# Patient Record
Sex: FEMALE | Race: WHITE | NOT HISPANIC OR LATINO | Employment: OTHER | URBAN - METROPOLITAN AREA
[De-identification: names, ages, dates, MRNs, and addresses within clinical notes are randomized per-mention and may not be internally consistent; named-entity substitution may affect disease eponyms.]

---

## 2017-04-13 ENCOUNTER — HOSPITAL ENCOUNTER (OUTPATIENT)
Dept: MAMMOGRAPHY | Facility: HOSPITAL | Age: 72
Discharge: HOME | End: 2017-04-13
Attending: INTERNAL MEDICINE

## 2017-04-13 DIAGNOSIS — Z12.31 VISIT FOR SCREENING MAMMOGRAM: ICD-10-CM

## 2017-04-17 ENCOUNTER — OFFICE VISIT - HEALTHEAST (OUTPATIENT)
Dept: INTERNAL MEDICINE | Facility: CLINIC | Age: 72
End: 2017-04-17

## 2017-04-17 DIAGNOSIS — M16.11 PRIMARY OSTEOARTHRITIS OF RIGHT HIP: ICD-10-CM

## 2017-04-17 DIAGNOSIS — R06.09 DYSPNEA ON EXERTION: ICD-10-CM

## 2017-04-17 DIAGNOSIS — R91.1 PULMONARY NODULE: ICD-10-CM

## 2017-04-17 DIAGNOSIS — G89.29 CHRONIC LOW BACK PAIN WITHOUT SCIATICA: ICD-10-CM

## 2017-04-17 DIAGNOSIS — I47.10 SVT (SUPRAVENTRICULAR TACHYCARDIA) (H): ICD-10-CM

## 2017-04-17 DIAGNOSIS — E78.00 HYPERCHOLESTEROLEMIA: ICD-10-CM

## 2017-04-17 DIAGNOSIS — M81.0 OSTEOPOROSIS: ICD-10-CM

## 2017-04-17 DIAGNOSIS — Z00.00 MEDICARE ANNUAL WELLNESS VISIT, SUBSEQUENT: ICD-10-CM

## 2017-04-17 DIAGNOSIS — I25.10 CORONARY ATHEROSCLEROSIS DUE TO CALCIFIED CORONARY LESION: ICD-10-CM

## 2017-04-17 DIAGNOSIS — I25.84 CORONARY ATHEROSCLEROSIS DUE TO CALCIFIED CORONARY LESION: ICD-10-CM

## 2017-04-17 DIAGNOSIS — M54.50 CHRONIC LOW BACK PAIN WITHOUT SCIATICA: ICD-10-CM

## 2017-04-17 DIAGNOSIS — R10.13 EPIGASTRIC ABDOMINAL PAIN: ICD-10-CM

## 2017-04-17 DIAGNOSIS — I35.0 MILD AORTIC STENOSIS: ICD-10-CM

## 2017-04-17 DIAGNOSIS — D47.2 MONOCLONAL GAMMOPATHY OF UNDETERMINED SIGNIFICANCE: ICD-10-CM

## 2017-04-17 LAB
CHOLEST SERPL-MCNC: 234 MG/DL
FASTING STATUS PATIENT QL REPORTED: YES
HDLC SERPL-MCNC: 69 MG/DL
LDLC SERPL CALC-MCNC: 145 MG/DL
TRIGL SERPL-MCNC: 100 MG/DL

## 2017-04-17 ASSESSMENT — MIFFLIN-ST. JEOR: SCORE: 1276.94

## 2017-04-18 ENCOUNTER — COMMUNICATION - HEALTHEAST (OUTPATIENT)
Dept: INTERNAL MEDICINE | Facility: CLINIC | Age: 72
End: 2017-04-18

## 2017-04-19 ENCOUNTER — HOSPITAL ENCOUNTER (OUTPATIENT)
Dept: ULTRASOUND IMAGING | Facility: HOSPITAL | Age: 72
Discharge: HOME | End: 2017-04-19
Attending: INTERNAL MEDICINE

## 2017-04-19 DIAGNOSIS — R10.13 EPIGASTRIC ABDOMINAL PAIN: ICD-10-CM

## 2017-04-21 ENCOUNTER — COMMUNICATION - HEALTHEAST (OUTPATIENT)
Dept: INTERNAL MEDICINE | Facility: CLINIC | Age: 72
End: 2017-04-21

## 2017-04-24 ENCOUNTER — RECORDS - HEALTHEAST (OUTPATIENT)
Dept: BONE DENSITY | Facility: CLINIC | Age: 72
End: 2017-04-24

## 2017-04-24 ENCOUNTER — RECORDS - HEALTHEAST (OUTPATIENT)
Dept: ADMINISTRATIVE | Facility: OTHER | Age: 72
End: 2017-04-24

## 2017-04-24 DIAGNOSIS — M81.0 AGE-RELATED OSTEOPOROSIS WITHOUT CURRENT PATHOLOGICAL FRACTURE: ICD-10-CM

## 2017-04-28 ENCOUNTER — HOSPITAL ENCOUNTER (OUTPATIENT)
Dept: CARDIOLOGY | Facility: HOSPITAL | Age: 72
Discharge: HOME | End: 2017-04-28
Attending: INTERNAL MEDICINE

## 2017-04-28 DIAGNOSIS — I35.0 MILD AORTIC STENOSIS: ICD-10-CM

## 2017-04-28 LAB
AORTIC ROOT: 4.3 CM
AORTIC VALVE MEAN VELOCITY: 179 CM/S
AV DIMENSIONLESS INDEX VTI: 0.5
AV MEAN GRADIENT: 14 MMHG
AV PEAK GRADIENT: 23 MMHG
AV VALVE AREA: 1.9 CM2
AV VELOCITY RATIO: 0.6
BSA FOR ECHO PROCEDURE: 1.92 M2
CV BLOOD PRESSURE: NORMAL MMHG
CV ECHO HEIGHT: 60 IN
CV ECHO WEIGHT: 193 LBS
DOP CALC AO PEAK VEL: 240 CM/S
DOP CALC AO VTI: 55.4 CM
DOP CALC LVOT AREA: 3.8 CM2
DOP CALC LVOT DIAMETER: 2.2 CM
DOP CALC LVOT PEAK VEL: 133 CM/S
DOP CALC LVOT STROKE VOLUME: 104.1 CM3
DOP CALCLVOT PEAK VEL VTI: 27.4 CM
EJECTION FRACTION: 61 % (ref 55–75)
FRACTIONAL SHORTENING: 28.1 % (ref 28–44)
INTERVENTRICULAR SEPTUM IN END DIASTOLE: 1.12 CM (ref 0.6–0.9)
IVS/PW RATIO: 1
LA AREA 1: 13 CM2
LA AREA 2: 17.7 CM2
LEFT ATRIUM LENGTH: 4.2 CM
LEFT ATRIUM SIZE: 3.7 CM
LEFT ATRIUM VOLUME INDEX: 24.3 ML/M2
LEFT ATRIUM VOLUME: 46.6 CM3
LEFT VENTRICLE CARDIAC INDEX: 3.8 L/MIN/M2
LEFT VENTRICLE CARDIAC OUTPUT: 7.3 L/MIN
LEFT VENTRICLE DIASTOLIC VOLUME INDEX: 60.4 CM3/M2 (ref 34–74)
LEFT VENTRICLE DIASTOLIC VOLUME: 116 CM3 (ref 46–106)
LEFT VENTRICLE HEART RATE: 70 BPM
LEFT VENTRICLE MASS INDEX: 91.4 G/M2
LEFT VENTRICLE SYSTOLIC VOLUME INDEX: 23.8 CM3/M2 (ref 11–31)
LEFT VENTRICLE SYSTOLIC VOLUME: 45.6 CM3 (ref 14–42)
LEFT VENTRICULAR INTERNAL DIMENSION IN DIASTOLE: 4.49 CM (ref 3.8–5.2)
LEFT VENTRICULAR INTERNAL DIMENSION IN SYSTOLE: 3.23 CM (ref 2.2–3.5)
LEFT VENTRICULAR MASS: 175.5 G
LEFT VENTRICULAR OUTFLOW TRACT MEAN GRADIENT: 4 MMHG
LEFT VENTRICULAR OUTFLOW TRACT MEAN VELOCITY: 99.3 CM/S
LEFT VENTRICULAR OUTFLOW TRACT PEAK GRADIENT: 7 MMHG
LEFT VENTRICULAR POSTERIOR WALL IN END DIASTOLE: 1.09 CM (ref 0.6–0.9)
LV STROKE VOLUME INDEX: 54.2 ML/M2
MITRAL VALVE E/A RATIO: 0.5
MV AVERAGE E/E' RATIO: 7.4 CM/S
MV DECELERATION TIME: 370 MS
MV E'TISSUE VEL-LAT: 7.83 CM/S
MV E'TISSUE VEL-MED: 7.64 CM/S
MV LATERAL E/E' RATIO: 7.3
MV MEDIAL E/E' RATIO: 7.5
MV PEAK A VELOCITY: 106 CM/S
MV PEAK E VELOCITY: 57.2 CM/S
NUC REST DIASTOLIC VOLUME INDEX: 3088 LBS
NUC REST SYSTOLIC VOLUME INDEX: 60 IN

## 2017-04-28 ASSESSMENT — MIFFLIN-ST. JEOR: SCORE: 1276.94

## 2017-05-24 ENCOUNTER — RECORDS - HEALTHEAST (OUTPATIENT)
Dept: ADMINISTRATIVE | Facility: OTHER | Age: 72
End: 2017-05-24

## 2017-06-27 ENCOUNTER — RECORDS - HEALTHEAST (OUTPATIENT)
Dept: ADMINISTRATIVE | Facility: OTHER | Age: 72
End: 2017-06-27

## 2017-10-19 ENCOUNTER — AMBULATORY - HEALTHEAST (OUTPATIENT)
Dept: NURSING | Facility: CLINIC | Age: 72
End: 2017-10-19

## 2018-04-20 ENCOUNTER — AMBULATORY - HEALTHEAST (OUTPATIENT)
Dept: NURSING | Facility: CLINIC | Age: 73
End: 2018-04-20

## 2018-10-05 ENCOUNTER — HOSPITAL ENCOUNTER (OUTPATIENT)
Dept: MAMMOGRAPHY | Facility: CLINIC | Age: 73
Discharge: HOME | End: 2018-10-05
Attending: INTERNAL MEDICINE

## 2018-10-05 DIAGNOSIS — Z12.31 VISIT FOR SCREENING MAMMOGRAM: ICD-10-CM

## 2018-10-19 ENCOUNTER — AMBULATORY - HEALTHEAST (OUTPATIENT)
Dept: INTERNAL MEDICINE | Facility: CLINIC | Age: 73
End: 2018-10-19

## 2018-10-22 ENCOUNTER — OFFICE VISIT - HEALTHEAST (OUTPATIENT)
Dept: INTERNAL MEDICINE | Facility: CLINIC | Age: 73
End: 2018-10-22

## 2018-10-22 DIAGNOSIS — H57.11 PAIN IN AND AROUND EYE, RIGHT: ICD-10-CM

## 2018-10-22 DIAGNOSIS — E78.00 HYPERCHOLESTEROLEMIA: ICD-10-CM

## 2018-10-22 DIAGNOSIS — G89.29 CHRONIC LOW BACK PAIN WITHOUT SCIATICA: ICD-10-CM

## 2018-10-22 DIAGNOSIS — I35.0 MILD AORTIC STENOSIS: ICD-10-CM

## 2018-10-22 DIAGNOSIS — D47.2 MONOCLONAL GAMMOPATHY OF UNDETERMINED SIGNIFICANCE: ICD-10-CM

## 2018-10-22 DIAGNOSIS — I77.810 DILATATION OF THORACIC AORTA (H): ICD-10-CM

## 2018-10-22 DIAGNOSIS — I25.10 CORONARY ATHEROSCLEROSIS DUE TO CALCIFIED CORONARY LESION: ICD-10-CM

## 2018-10-22 DIAGNOSIS — Z00.00 MEDICARE ANNUAL WELLNESS VISIT, SUBSEQUENT: ICD-10-CM

## 2018-10-22 DIAGNOSIS — R91.1 PULMONARY NODULE: ICD-10-CM

## 2018-10-22 DIAGNOSIS — M54.50 CHRONIC LOW BACK PAIN WITHOUT SCIATICA: ICD-10-CM

## 2018-10-22 DIAGNOSIS — Z00.00 HEALTHCARE MAINTENANCE: ICD-10-CM

## 2018-10-22 DIAGNOSIS — M16.11 PRIMARY OSTEOARTHRITIS OF RIGHT HIP: ICD-10-CM

## 2018-10-22 DIAGNOSIS — I25.84 CORONARY ATHEROSCLEROSIS DUE TO CALCIFIED CORONARY LESION: ICD-10-CM

## 2018-10-22 DIAGNOSIS — M81.0 OSTEOPOROSIS: ICD-10-CM

## 2018-10-22 DIAGNOSIS — K44.9 DIAPHRAGMATIC HERNIA: ICD-10-CM

## 2018-10-22 DIAGNOSIS — I47.10 SVT (SUPRAVENTRICULAR TACHYCARDIA) (H): ICD-10-CM

## 2018-10-22 LAB
ALBUMIN SERPL-MCNC: 4 G/DL (ref 3.5–5)
ALBUMIN UR-MCNC: NEGATIVE MG/DL
ALP SERPL-CCNC: 56 U/L (ref 45–120)
ALT SERPL W P-5'-P-CCNC: 14 U/L (ref 0–45)
ANION GAP SERPL CALCULATED.3IONS-SCNC: 12 MMOL/L (ref 5–18)
APPEARANCE UR: CLEAR
AST SERPL W P-5'-P-CCNC: 18 U/L (ref 0–40)
BACTERIA #/AREA URNS HPF: ABNORMAL HPF
BILIRUB SERPL-MCNC: 1.1 MG/DL (ref 0–1)
BILIRUB UR QL STRIP: NEGATIVE
BUN SERPL-MCNC: 15 MG/DL (ref 8–28)
CALCIUM SERPL-MCNC: 9.7 MG/DL (ref 8.5–10.5)
CHLORIDE BLD-SCNC: 105 MMOL/L (ref 98–107)
CHOLEST SERPL-MCNC: 219 MG/DL
CO2 SERPL-SCNC: 27 MMOL/L (ref 22–31)
COLOR UR AUTO: YELLOW
CREAT SERPL-MCNC: 0.7 MG/DL (ref 0.6–1.1)
ERYTHROCYTE [DISTWIDTH] IN BLOOD BY AUTOMATED COUNT: 11.9 % (ref 11–14.5)
ERYTHROCYTE [SEDIMENTATION RATE] IN BLOOD BY WESTERGREN METHOD: 11 MM/HR (ref 0–20)
FASTING STATUS PATIENT QL REPORTED: YES
GFR SERPL CREATININE-BSD FRML MDRD: >60 ML/MIN/1.73M2
GLUCOSE BLD-MCNC: 87 MG/DL (ref 70–125)
GLUCOSE UR STRIP-MCNC: NEGATIVE MG/DL
HCT VFR BLD AUTO: 39.1 % (ref 35–47)
HDLC SERPL-MCNC: 71 MG/DL
HGB BLD-MCNC: 12.9 G/DL (ref 12–16)
HGB UR QL STRIP: ABNORMAL
KETONES UR STRIP-MCNC: NEGATIVE MG/DL
LDLC SERPL CALC-MCNC: 129 MG/DL
LEUKOCYTE ESTERASE UR QL STRIP: ABNORMAL
MCH RBC QN AUTO: 32.5 PG (ref 27–34)
MCHC RBC AUTO-ENTMCNC: 32.9 G/DL (ref 32–36)
MCV RBC AUTO: 99 FL (ref 80–100)
NITRATE UR QL: NEGATIVE
PH UR STRIP: 7.5 [PH] (ref 5–8)
PLATELET # BLD AUTO: 301 THOU/UL (ref 140–440)
PMV BLD AUTO: 7.4 FL (ref 7–10)
POTASSIUM BLD-SCNC: 3.8 MMOL/L (ref 3.5–5)
PROT SERPL-MCNC: 6.7 G/DL (ref 6–8)
RBC # BLD AUTO: 3.95 MILL/UL (ref 3.8–5.4)
RBC #/AREA URNS AUTO: ABNORMAL HPF
SODIUM SERPL-SCNC: 144 MMOL/L (ref 136–145)
SP GR UR STRIP: 1.01 (ref 1–1.03)
SQUAMOUS #/AREA URNS AUTO: ABNORMAL LPF
TRIGL SERPL-MCNC: 95 MG/DL
UROBILINOGEN UR STRIP-ACNC: ABNORMAL
WBC #/AREA URNS AUTO: ABNORMAL HPF
WBC: 8.2 THOU/UL (ref 4–11)

## 2018-10-22 ASSESSMENT — MIFFLIN-ST. JEOR: SCORE: 1149.94

## 2018-10-23 LAB
25(OH)D3 SERPL-MCNC: 66.3 NG/ML (ref 30–80)
25(OH)D3 SERPL-MCNC: 66.3 NG/ML (ref 30–80)

## 2018-10-24 LAB
ALBUMIN PERCENT: 65.6 % (ref 51–67)
ALBUMIN SERPL ELPH-MCNC: 4.1 G/DL (ref 3.2–4.7)
ALPHA 1 PERCENT: 2.8 % (ref 2–4)
ALPHA 2 PERCENT: 11.2 % (ref 5–13)
ALPHA1 GLOB SERPL ELPH-MCNC: 0.2 G/DL (ref 0.1–0.3)
ALPHA2 GLOB SERPL ELPH-MCNC: 0.7 G/DL (ref 0.4–0.9)
B-GLOBULIN SERPL ELPH-MCNC: 0.7 G/DL (ref 0.7–1.2)
BETA PERCENT: 11.1 % (ref 10–17)
GAMMA GLOB SERPL ELPH-MCNC: 0.6 G/DL (ref 0.6–1.4)
GAMMA GLOBULIN PERCENT: 9.3 % (ref 9–20)
M PROTEIN SERPL ELPH-MCNC: 0.1 G/DL
PATH ICD:: NORMAL
PROT PATTERN SERPL ELPH-IMP: NORMAL
PROT SERPL-MCNC: 6.3 G/DL (ref 6–8)
REVIEWING PATHOLOGIST: NORMAL

## 2018-10-26 ENCOUNTER — AMBULATORY - HEALTHEAST (OUTPATIENT)
Dept: INTERNAL MEDICINE | Facility: CLINIC | Age: 73
End: 2018-10-26

## 2018-10-26 DIAGNOSIS — R77.8 ABNORMAL SPEP: ICD-10-CM

## 2018-10-31 ENCOUNTER — AMBULATORY - HEALTHEAST (OUTPATIENT)
Dept: LAB | Facility: CLINIC | Age: 73
End: 2018-10-31

## 2018-10-31 DIAGNOSIS — R77.8 ABNORMAL SPEP: ICD-10-CM

## 2018-11-02 LAB
PATH ICD:: NORMAL
PROT PATTERN SERPL IFE-IMP: NORMAL
REVIEWING PATHOLOGIST: NORMAL

## 2019-03-27 ENCOUNTER — COMMUNICATION - HEALTHEAST (OUTPATIENT)
Dept: GENERAL RADIOLOGY | Facility: CLINIC | Age: 74
End: 2019-03-27

## 2019-04-23 ENCOUNTER — COMMUNICATION - HEALTHEAST (OUTPATIENT)
Dept: GENERAL RADIOLOGY | Facility: CLINIC | Age: 74
End: 2019-04-23

## 2019-04-24 ENCOUNTER — AMBULATORY - HEALTHEAST (OUTPATIENT)
Dept: NURSING | Facility: CLINIC | Age: 74
End: 2019-04-24

## 2019-08-08 ENCOUNTER — APPOINTMENT (OUTPATIENT)
Dept: GENERAL RADIOLOGY | Facility: CLINIC | Age: 74
End: 2019-08-08
Attending: EMERGENCY MEDICINE
Payer: COMMERCIAL

## 2019-08-08 ENCOUNTER — HOSPITAL ENCOUNTER (EMERGENCY)
Facility: CLINIC | Age: 74
Discharge: HOME | End: 2019-08-08
Attending: EMERGENCY MEDICINE | Admitting: EMERGENCY MEDICINE
Payer: COMMERCIAL

## 2019-08-08 VITALS
DIASTOLIC BLOOD PRESSURE: 97 MMHG | OXYGEN SATURATION: 97 % | HEART RATE: 56 BPM | RESPIRATION RATE: 18 BRPM | SYSTOLIC BLOOD PRESSURE: 151 MMHG | TEMPERATURE: 97.6 F

## 2019-08-08 DIAGNOSIS — S52.615A CLOSED NONDISPLACED FRACTURE OF STYLOID PROCESS OF LEFT ULNA, INITIAL ENCOUNTER: ICD-10-CM

## 2019-08-08 DIAGNOSIS — S06.0X0A CONCUSSION WITHOUT LOSS OF CONSCIOUSNESS, INITIAL ENCOUNTER: ICD-10-CM

## 2019-08-08 DIAGNOSIS — S52.572A OTHER CLOSED INTRA-ARTICULAR FRACTURE OF DISTAL END OF LEFT RADIUS, INITIAL ENCOUNTER: ICD-10-CM

## 2019-08-08 PROCEDURE — 29125 APPL SHORT ARM SPLINT STATIC: CPT | Mod: LT

## 2019-08-08 PROCEDURE — 73110 X-RAY EXAM OF WRIST: CPT | Mod: LT

## 2019-08-08 PROCEDURE — 99284 EMERGENCY DEPT VISIT MOD MDM: CPT

## 2019-08-08 RX ORDER — IBUPROFEN 600 MG/1
600 TABLET, FILM COATED ORAL EVERY 6 HOURS PRN
Qty: 20 TABLET | Refills: 0 | Status: SHIPPED | OUTPATIENT
Start: 2019-08-08 | End: 2023-07-10

## 2019-08-08 RX ORDER — HYDROCODONE BITARTRATE AND ACETAMINOPHEN 5; 325 MG/1; MG/1
1 TABLET ORAL EVERY 6 HOURS PRN
Qty: 8 TABLET | Refills: 0 | Status: SHIPPED | OUTPATIENT
Start: 2019-08-08 | End: 2023-07-10

## 2019-08-08 ASSESSMENT — ENCOUNTER SYMPTOMS
ARTHRALGIAS: 1
JOINT SWELLING: 1

## 2019-08-08 NOTE — ED AVS SNAPSHOT
[facility] Emergency Department  [address]  [facility] [address]  Phone:  [phone]  Fax:  [phone]                                    Francoise Sutherland   MRN: [id number]    Department:  [facility] Emergency Department   Date of Visit:  8/8/2019           After Visit Summary Signature Page    I have received my discharge instructions, and my questions have been answered. I have discussed any challenges I see with this plan with the nurse or doctor.    ..........................................................................................................................................  Patient/Patient Representative Signature      ..........................................................................................................................................  Patient Representative Print Name and Relationship to Patient    ..................................................               ................................................  Date                                   Time    ..........................................................................................................................................  Reviewed by Signature/Title    ...................................................              ..............................................  Date                                               Time          22EPIC Rev 08/18

## 2019-08-09 NOTE — ED PROVIDER NOTES
History     Chief Complaint:  Fall and Wrist Pain    HPI  Francoise Sutherland is a 74 year old female, R. Hand dominant, who presents to the emergency department today for evaluation left wrist pain after a fall. The patient reports she tripped on a declined sidewalk, fell backwards, landed on her outstretched left wrist, and struck her head. She endorses left wrist pain and swelling but denies LOC, paresthesias, headache or other symptoms. No anticoagulation.     Allergies:  Atorvastatin  Hydrocodone-Acetaminophen        Medications:    Medications reviewed. No pertinent medications.    Past Medical History:    Pulmonary nodule   Diaphragmatic hernia   Restrictive lung disease   Shortness of breath  Hypercholesteremia     Past Surgical History:    Hernia repair    Family History:    Family history reviewed. No pertinent family history.    Social History:  The patient is a former smoker. He has never used smokeless tobacco.    Marital Status:      Review of Systems   Musculoskeletal: Positive for arthralgias (left wrist) and joint swelling (left wrist).   All other systems reviewed and are negative.      Physical Exam     Patient Vitals for the past 24 hrs:   BP Temp Temp src Pulse Heart Rate Resp SpO2   08/08/19 2028 (!) 151/97 97.6  F (36.4  C) Oral 56 56 18 97 %     Physical Exam  General: Alert. Appears comfortable  Head:  The scalp, face, and head appear normal without trauma  Eyes:  Sclera white; Pupils are equal and round  ENT:    External ears normal.  No hemotympanum.      External nares normal.  No septal hematoma.    Neck:  No midline tenderness or pain with full ROM.  CV:  Rate as above with regular rhythm   No murmur   2/2 radial and dorsal pedal pulses  Resp:  Breath sounds clear and equal bilaterally    Non-labored, no retractions or accessory muscle use  GI:  Abdomen soft, non-tender, non-distended    No rebound tenderness or guarding  MSK:  No midline tenderness or bony step-off    No  "deformity    Moves all extremities equally and symmetrically    Mild L. Wrist tenderness, no obvious deformity, no overlying crepitance, warmth or ecchymosis; no L. Elbow tenderness  Skin:  No rash or lesions noted.  Neuro:   No apparent deficit.    Strength 5/5 x4.  Sensation intact x4.     GCS: 15  Psych:  Normal affect.        Emergency Department Course     Imaging:  Radiology findings were communicated with the patient who voiced understanding of the findings.    XR Wrist Left G/E 3 Views  Nondisplaced ulnar styloid fracture. Mildly comminuted and displaced intra-articular distal radial fracture. Diffuse osteopenia. Degenerative disease, severe at the first carpometacarpal joint and adjacent intercarpal joints.    Procedures   Splint Placement      PLACEMENT: Custom Orthoglass splint 3\" was applied to the left distal upper extremity and after placement I checked and adjusted the fit to ensure proper positioning. The patient was more comfortable with the splint in place. Sensation and circulation are intact after splint placement.   Emergency Department Course:  2106 Nursing notes and vitals reviewed.  2111 I performed a physical examination of the patient as documented above.  2039 The patient was sent for a XR Wrist Left while in the emergency department, results above.   2110 I performed the splint placement procedure as documented above.  2203 I spoke with Dr. Gomez of the orthopedic service regarding patient's presentation, findings, and plan of care.  2225 I personally reviewed the results with the patient and  and answered all related questions prior to discharge.    Impression & Plan      Medical Decision Making:  Francoise Sutherland is a 74 year old female presenting with left wrist pain. She is neurovascularly intact on arrival. X-ray with nondisplaced ulnar styloid fracture. Noted mildly comminuted intraarticular distal radius fracture. No indication for emergent reduction.  She was placed " in an Orthoglass splint and provided a sling.  She remained neurovascularly intact post splint.. Plans for close outpatient ortho follow-up. She did report hitting her head, though no evidence of trauma. There was no reported loss of consciousness, emesis. I discussed no indication for emergent head CT at this point in time though did  patient on red flag symptoms to necessitate return to the ED.  She will be managed as sustaining a concussion. Secondary impact syndrome discussed. Plans for close outpatient ortho follow up. Ortho stated they would clal patient tmrw to arrange f/u.  Return precautions given.     Diagnosis:    ICD-10-CM    1. Closed nondisplaced fracture of styloid process of left ulna, initial encounter S52.615A    2. Other closed intra-articular fracture of distal end of left radius, initial encounter S52.572A    3. Concussion without loss of consciousness, initial encounter S06.0X0A      Disposition:   The patient is discharged to home.    Discharge Medications:  START taking      Dose / Directions   HYDROcodone-acetaminophen 5-325 MG tablet  Commonly known as:  NORCO      Dose:  1 tablet  Take 1 tablet by mouth every 6 hours as needed for pain  Quantity:  8 tablet  Refills:  0     ibuprofen 600 MG tablet  Commonly known as:  ADVIL/MOTRIN      Dose:  600 mg  Take 1 tablet (600 mg) by mouth every 6 hours as needed  Quantity:  20 tablet  Refills:  0       Scribe Disclosure:  I, Shaun Vidal, am serving as a scribe at 8:53 PM on 8/8/2019 to document services personally performed by Luna Gallo DO based on my observations and the provider's statements to me.    [facility] EMERGENCY DEPARTMENT       Luna Gallo DO  08/09/19 0000

## 2019-08-09 NOTE — ED TRIAGE NOTES
Trip and fall onto cement eileen. Fell backward hitting head on sign post and hurting left wrist. Pain is worst to left wrist. Denies any LOC. ABCs intact.

## 2019-08-13 ENCOUNTER — RECORDS - HEALTHEAST (OUTPATIENT)
Dept: LAB | Facility: CLINIC | Age: 74
End: 2019-08-13

## 2019-08-13 LAB
ALP SERPL-CCNC: 55 U/L (ref 45–120)
CALCIUM SERPL-MCNC: 9.7 MG/DL (ref 8.5–10.5)
PTH-INTACT SERPL-MCNC: 43 PG/ML (ref 10–86)

## 2019-08-14 ENCOUNTER — RECORDS - HEALTHEAST (OUTPATIENT)
Dept: ADMINISTRATIVE | Facility: OTHER | Age: 74
End: 2019-08-14

## 2019-08-14 LAB — 25(OH)D3 SERPL-MCNC: 66.1 NG/ML (ref 30–80)

## 2019-10-29 ENCOUNTER — COMMUNICATION - HEALTHEAST (OUTPATIENT)
Dept: GENERAL RADIOLOGY | Facility: CLINIC | Age: 74
End: 2019-10-29

## 2019-10-31 ENCOUNTER — AMBULATORY - HEALTHEAST (OUTPATIENT)
Dept: NURSING | Facility: CLINIC | Age: 74
End: 2019-10-31

## 2020-04-07 ENCOUNTER — COMMUNICATION - HEALTHEAST (OUTPATIENT)
Dept: INTERNAL MEDICINE | Facility: CLINIC | Age: 75
End: 2020-04-07

## 2020-04-13 ENCOUNTER — RECORDS - HEALTHEAST (OUTPATIENT)
Dept: ADMINISTRATIVE | Facility: OTHER | Age: 75
End: 2020-04-13

## 2020-04-14 ENCOUNTER — COMMUNICATION - HEALTHEAST (OUTPATIENT)
Dept: INTERNAL MEDICINE | Facility: CLINIC | Age: 75
End: 2020-04-14

## 2020-04-15 ENCOUNTER — RECORDS - HEALTHEAST (OUTPATIENT)
Dept: ADMINISTRATIVE | Facility: OTHER | Age: 75
End: 2020-04-15

## 2020-04-15 ENCOUNTER — COMMUNICATION - HEALTHEAST (OUTPATIENT)
Dept: SCHEDULING | Facility: CLINIC | Age: 75
End: 2020-04-15

## 2020-04-17 ENCOUNTER — RECORDS - HEALTHEAST (OUTPATIENT)
Dept: ADMINISTRATIVE | Facility: OTHER | Age: 75
End: 2020-04-17

## 2020-04-20 ENCOUNTER — RECORDS - HEALTHEAST (OUTPATIENT)
Dept: ADMINISTRATIVE | Facility: OTHER | Age: 75
End: 2020-04-20

## 2020-04-21 ENCOUNTER — COMMUNICATION - HEALTHEAST (OUTPATIENT)
Dept: INTERNAL MEDICINE | Facility: CLINIC | Age: 75
End: 2020-04-21

## 2020-04-24 ENCOUNTER — OFFICE VISIT - HEALTHEAST (OUTPATIENT)
Dept: INTERNAL MEDICINE | Facility: CLINIC | Age: 75
End: 2020-04-24

## 2020-04-24 DIAGNOSIS — I33.0 ACUTE BACTERIAL ENDOCARDITIS: ICD-10-CM

## 2020-04-24 ASSESSMENT — PATIENT HEALTH QUESTIONNAIRE - PHQ9: SUM OF ALL RESPONSES TO PHQ QUESTIONS 1-9: 0

## 2020-04-27 ENCOUNTER — COMMUNICATION - HEALTHEAST (OUTPATIENT)
Dept: SCHEDULING | Facility: CLINIC | Age: 75
End: 2020-04-27

## 2020-04-27 ENCOUNTER — OFFICE VISIT - HEALTHEAST (OUTPATIENT)
Dept: INTERNAL MEDICINE | Facility: CLINIC | Age: 75
End: 2020-04-27

## 2020-04-27 DIAGNOSIS — M79.662 PAIN OF LEFT LOWER LEG: ICD-10-CM

## 2020-04-27 DIAGNOSIS — L98.9 LEG LESION: ICD-10-CM

## 2020-04-28 ENCOUNTER — RECORDS - HEALTHEAST (OUTPATIENT)
Dept: SCHEDULING | Facility: CLINIC | Age: 75
End: 2020-04-28

## 2020-04-28 ENCOUNTER — HOSPITAL ENCOUNTER (OUTPATIENT)
Dept: ULTRASOUND IMAGING | Facility: HOSPITAL | Age: 75
Discharge: HOME | End: 2020-04-28
Attending: INTERNAL MEDICINE

## 2020-04-28 ENCOUNTER — COMMUNICATION - HEALTHEAST (OUTPATIENT)
Dept: INTERNAL MEDICINE | Facility: CLINIC | Age: 75
End: 2020-04-28

## 2020-04-28 DIAGNOSIS — M79.662 PAIN OF LEFT LOWER LEG: ICD-10-CM

## 2020-04-30 ENCOUNTER — RECORDS - HEALTHEAST (OUTPATIENT)
Dept: ADMINISTRATIVE | Facility: OTHER | Age: 75
End: 2020-04-30

## 2020-05-01 ENCOUNTER — RECORDS - HEALTHEAST (OUTPATIENT)
Dept: ADMINISTRATIVE | Facility: OTHER | Age: 75
End: 2020-05-01

## 2020-05-05 ENCOUNTER — RECORDS - HEALTHEAST (OUTPATIENT)
Dept: ADMINISTRATIVE | Facility: OTHER | Age: 75
End: 2020-05-05

## 2020-05-06 ENCOUNTER — RECORDS - HEALTHEAST (OUTPATIENT)
Dept: ADMINISTRATIVE | Facility: OTHER | Age: 75
End: 2020-05-06

## 2020-05-12 ENCOUNTER — COMMUNICATION - HEALTHEAST (OUTPATIENT)
Dept: INTERNAL MEDICINE | Facility: CLINIC | Age: 75
End: 2020-05-12

## 2020-05-19 ENCOUNTER — RECORDS - HEALTHEAST (OUTPATIENT)
Dept: ADMINISTRATIVE | Facility: OTHER | Age: 75
End: 2020-05-19

## 2020-05-24 ENCOUNTER — RECORDS - HEALTHEAST (OUTPATIENT)
Dept: ADMINISTRATIVE | Facility: OTHER | Age: 75
End: 2020-05-24

## 2020-05-27 ENCOUNTER — RECORDS - HEALTHEAST (OUTPATIENT)
Dept: ADMINISTRATIVE | Facility: OTHER | Age: 75
End: 2020-05-27

## 2020-06-30 ENCOUNTER — COMMUNICATION - HEALTHEAST (OUTPATIENT)
Dept: SCHEDULING | Facility: CLINIC | Age: 75
End: 2020-06-30

## 2020-07-08 ENCOUNTER — AMBULATORY - HEALTHEAST (OUTPATIENT)
Dept: INTERNAL MEDICINE | Facility: CLINIC | Age: 75
End: 2020-07-08

## 2020-07-08 ENCOUNTER — OFFICE VISIT - HEALTHEAST (OUTPATIENT)
Dept: INTERNAL MEDICINE | Facility: CLINIC | Age: 75
End: 2020-07-08

## 2020-07-08 DIAGNOSIS — U07.1 COVID-19: ICD-10-CM

## 2020-07-08 DIAGNOSIS — E78.00 HYPERCHOLESTEROLEMIA: ICD-10-CM

## 2020-07-08 DIAGNOSIS — I25.84 CORONARY ATHEROSCLEROSIS DUE TO CALCIFIED CORONARY LESION: ICD-10-CM

## 2020-07-08 DIAGNOSIS — I47.10 SVT (SUPRAVENTRICULAR TACHYCARDIA) (H): ICD-10-CM

## 2020-07-08 DIAGNOSIS — R53.83 FATIGUE, UNSPECIFIED TYPE: ICD-10-CM

## 2020-07-08 DIAGNOSIS — I33.0 ACUTE BACTERIAL ENDOCARDITIS: ICD-10-CM

## 2020-07-08 DIAGNOSIS — E83.52 HYPERCALCEMIA: ICD-10-CM

## 2020-07-08 DIAGNOSIS — Z12.31 ENCOUNTER FOR SCREENING MAMMOGRAM FOR BREAST CANCER: ICD-10-CM

## 2020-07-08 DIAGNOSIS — D47.2 MONOCLONAL GAMMOPATHY OF UNDETERMINED SIGNIFICANCE: ICD-10-CM

## 2020-07-08 DIAGNOSIS — M81.0 AGE-RELATED OSTEOPOROSIS WITHOUT CURRENT PATHOLOGICAL FRACTURE: ICD-10-CM

## 2020-07-08 DIAGNOSIS — I25.10 CORONARY ATHEROSCLEROSIS DUE TO CALCIFIED CORONARY LESION: ICD-10-CM

## 2020-07-08 LAB
ALBUMIN SERPL-MCNC: 3.9 G/DL (ref 3.5–5)
ALP SERPL-CCNC: 55 U/L (ref 45–120)
ALT SERPL W P-5'-P-CCNC: 10 U/L (ref 0–45)
ANION GAP SERPL CALCULATED.3IONS-SCNC: 12 MMOL/L (ref 5–18)
AST SERPL W P-5'-P-CCNC: 18 U/L (ref 0–40)
BILIRUB SERPL-MCNC: 1 MG/DL (ref 0–1)
BUN SERPL-MCNC: 13 MG/DL (ref 8–28)
CALCIUM SERPL-MCNC: 11.7 MG/DL (ref 8.5–10.5)
CHLORIDE BLD-SCNC: 104 MMOL/L (ref 98–107)
CHOLEST SERPL-MCNC: 191 MG/DL
CO2 SERPL-SCNC: 28 MMOL/L (ref 22–31)
CREAT SERPL-MCNC: 1.01 MG/DL (ref 0.6–1.1)
ERYTHROCYTE [DISTWIDTH] IN BLOOD BY AUTOMATED COUNT: 14.3 % (ref 11–14.5)
FASTING STATUS PATIENT QL REPORTED: YES
GFR SERPL CREATININE-BSD FRML MDRD: 53 ML/MIN/1.73M2
GLUCOSE BLD-MCNC: 86 MG/DL (ref 70–125)
HCT VFR BLD AUTO: 33.3 % (ref 35–47)
HDLC SERPL-MCNC: 53 MG/DL
HGB BLD-MCNC: 11.1 G/DL (ref 12–16)
LDLC SERPL CALC-MCNC: 114 MG/DL
MCH RBC QN AUTO: 31.6 PG (ref 27–34)
MCHC RBC AUTO-ENTMCNC: 33.4 G/DL (ref 32–36)
MCV RBC AUTO: 95 FL (ref 80–100)
PLATELET # BLD AUTO: 327 THOU/UL (ref 140–440)
PMV BLD AUTO: 7.4 FL (ref 7–10)
POTASSIUM BLD-SCNC: 3.6 MMOL/L (ref 3.5–5)
PROT SERPL-MCNC: 6.8 G/DL (ref 6–8)
RBC # BLD AUTO: 3.52 MILL/UL (ref 3.8–5.4)
SODIUM SERPL-SCNC: 144 MMOL/L (ref 136–145)
TRIGL SERPL-MCNC: 122 MG/DL
TSH SERPL DL<=0.005 MIU/L-ACNC: 1.53 UIU/ML (ref 0.3–5)
WBC: 7 THOU/UL (ref 4–11)

## 2020-07-08 ASSESSMENT — MIFFLIN-ST. JEOR: SCORE: 1081.9

## 2020-07-09 LAB
25(OH)D3 SERPL-MCNC: 89.6 NG/ML (ref 30–80)
25(OH)D3 SERPL-MCNC: 89.6 NG/ML (ref 30–80)

## 2020-07-10 LAB
ALBUMIN PERCENT: 65.4 % (ref 51–67)
ALBUMIN SERPL ELPH-MCNC: 4.3 G/DL (ref 3.2–4.7)
ALPHA 1 PERCENT: 2.9 % (ref 2–4)
ALPHA 2 PERCENT: 10.1 % (ref 5–13)
ALPHA1 GLOB SERPL ELPH-MCNC: 0.2 G/DL (ref 0.1–0.3)
ALPHA2 GLOB SERPL ELPH-MCNC: 0.7 G/DL (ref 0.4–0.9)
B-GLOBULIN SERPL ELPH-MCNC: 0.7 G/DL (ref 0.7–1.2)
BETA PERCENT: 11.1 % (ref 10–17)
GAMMA GLOB SERPL ELPH-MCNC: 0.7 G/DL (ref 0.6–1.4)
GAMMA GLOBULIN PERCENT: 10.5 % (ref 9–20)
M PROTEIN SERPL ELPH-MCNC: 0.1 G/DL
PATH ICD:: NORMAL
PROT PATTERN SERPL ELPH-IMP: NORMAL
PROT SERPL-MCNC: 6.5 G/DL (ref 6–8)
REVIEWING PATHOLOGIST: NORMAL

## 2020-07-14 ENCOUNTER — OFFICE VISIT - HEALTHEAST (OUTPATIENT)
Dept: INTERNAL MEDICINE | Facility: CLINIC | Age: 75
End: 2020-07-14

## 2020-07-14 DIAGNOSIS — Z00.00 MEDICARE ANNUAL WELLNESS VISIT, SUBSEQUENT: ICD-10-CM

## 2020-07-14 DIAGNOSIS — M81.0 AGE-RELATED OSTEOPOROSIS WITHOUT CURRENT PATHOLOGICAL FRACTURE: ICD-10-CM

## 2020-07-14 DIAGNOSIS — E83.52 HYPERCALCEMIA: ICD-10-CM

## 2020-07-14 DIAGNOSIS — Z12.11 ENCOUNTER FOR SCREENING FOR MALIGNANT NEOPLASM OF COLON: ICD-10-CM

## 2020-07-14 DIAGNOSIS — D64.9 ANEMIA, UNSPECIFIED TYPE: ICD-10-CM

## 2020-07-14 DIAGNOSIS — D47.2 MONOCLONAL GAMMOPATHY OF UNDETERMINED SIGNIFICANCE: ICD-10-CM

## 2020-07-14 LAB
PATH ICD:: NORMAL
PROT PATTERN SERPL IFE-IMP: NORMAL
REVIEWING PATHOLOGIST: NORMAL

## 2020-07-16 ENCOUNTER — AMBULATORY - HEALTHEAST (OUTPATIENT)
Dept: INTERNAL MEDICINE | Facility: CLINIC | Age: 75
End: 2020-07-16

## 2020-07-16 DIAGNOSIS — D47.2 MONOCLONAL GAMMOPATHY OF UNDETERMINED SIGNIFICANCE: ICD-10-CM

## 2020-07-17 ENCOUNTER — AMBULATORY - HEALTHEAST (OUTPATIENT)
Dept: FAMILY MEDICINE | Facility: CLINIC | Age: 75
End: 2020-07-17

## 2020-07-17 DIAGNOSIS — U07.1 COVID-19: ICD-10-CM

## 2020-07-20 ENCOUNTER — RECORDS - HEALTHEAST (OUTPATIENT)
Dept: ADMINISTRATIVE | Facility: OTHER | Age: 75
End: 2020-07-20

## 2020-07-21 ENCOUNTER — COMMUNICATION - HEALTHEAST (OUTPATIENT)
Dept: INTERNAL MEDICINE | Facility: CLINIC | Age: 75
End: 2020-07-21

## 2020-07-27 ENCOUNTER — COMMUNICATION - HEALTHEAST (OUTPATIENT)
Dept: INTERNAL MEDICINE | Facility: CLINIC | Age: 75
End: 2020-07-27

## 2020-07-30 ENCOUNTER — AMBULATORY - HEALTHEAST (OUTPATIENT)
Dept: LAB | Facility: CLINIC | Age: 75
End: 2020-07-30

## 2020-07-30 DIAGNOSIS — E83.52 HYPERCALCEMIA: ICD-10-CM

## 2020-07-30 DIAGNOSIS — D64.9 ANEMIA, UNSPECIFIED TYPE: ICD-10-CM

## 2020-07-30 LAB
CALCIUM SERPL-MCNC: 11 MG/DL (ref 8.5–10.5)
IRON SATN MFR SERPL: 33 % (ref 20–50)
IRON SERPL-MCNC: 87 UG/DL (ref 42–175)
PTH-INTACT SERPL-MCNC: 18 PG/ML (ref 10–86)
TIBC SERPL-MCNC: 264 UG/DL (ref 313–563)
TRANSFERRIN SERPL-MCNC: 211 MG/DL (ref 212–360)
VIT B12 SERPL-MCNC: 398 PG/ML (ref 213–816)

## 2020-07-31 ENCOUNTER — AMBULATORY - HEALTHEAST (OUTPATIENT)
Dept: INTERNAL MEDICINE | Facility: CLINIC | Age: 75
End: 2020-07-31

## 2020-07-31 DIAGNOSIS — E83.52 HYPERCALCEMIA: ICD-10-CM

## 2020-08-06 ENCOUNTER — COMMUNICATION - HEALTHEAST (OUTPATIENT)
Dept: INTERNAL MEDICINE | Facility: CLINIC | Age: 75
End: 2020-08-06

## 2020-08-10 ENCOUNTER — HOSPITAL ENCOUNTER (OUTPATIENT)
Dept: MAMMOGRAPHY | Facility: CLINIC | Age: 75
Discharge: HOME | End: 2020-08-10
Attending: INTERNAL MEDICINE

## 2020-08-10 DIAGNOSIS — Z12.31 ENCOUNTER FOR SCREENING MAMMOGRAM FOR BREAST CANCER: ICD-10-CM

## 2020-10-18 ENCOUNTER — AMBULATORY - HEALTHEAST (OUTPATIENT)
Dept: NURSING | Facility: CLINIC | Age: 75
End: 2020-10-18

## 2020-12-21 ENCOUNTER — AMBULATORY - HEALTHEAST (OUTPATIENT)
Dept: LAB | Facility: CLINIC | Age: 75
End: 2020-12-21

## 2020-12-21 DIAGNOSIS — E83.52 HYPERCALCEMIA: ICD-10-CM

## 2020-12-21 LAB — CALCIUM SERPL-MCNC: 9.4 MG/DL (ref 8.5–10.5)

## 2020-12-22 LAB — 25(OH)D3 SERPL-MCNC: 39.3 NG/ML (ref 30–80)

## 2021-05-26 ASSESSMENT — PATIENT HEALTH QUESTIONNAIRE - PHQ9: SUM OF ALL RESPONSES TO PHQ QUESTIONS 1-9: 0

## 2021-05-28 ENCOUNTER — RECORDS - HEALTHEAST (OUTPATIENT)
Dept: ADMINISTRATIVE | Facility: CLINIC | Age: 76
End: 2021-05-28

## 2021-05-28 NOTE — PROGRESS NOTES
The following steps were completed to comply with the REMS program for Prolia:  1. Ordering provider has previously reviewed information in the Medication Guide and Patient Counseling Chart, including the serious risks of Prolia  and the symptoms of each risk and have been advised to seek prompt medical attention if they have signs or symptoms of any of the serious risks.  2. Provided each patient a copy of the Medication Guide and Patient Brochure.  See MAR for administration details.   Indication: Prolia  (denosumab) is a prescription medicine used to treat osteoporosis in patients who:   Are at high risk for fracture, meaning patients who have had a fracture related to osteoporosis, or who have multiple risk factors for fracture; Cannot use another osteoporosis medicine or other osteoporosis medicines did not work well.   The timeline for early/late injections would be 4 weeks early and any time after the 6 month melinda. If a patient receives their injection late, then the subsequent injection would be 6 months from the date that they actually received the injection    Have the screening questions been asked prior to this administration? Yes     Mrs. Moulton arrived at clinic for Prolia injection given Subcutaneous at Left arm.      Injection was given without incidence.      May DOMINGUEZ CMA/JESSIE....................11:03 AM    
No

## 2021-05-28 NOTE — TELEPHONE ENCOUNTER
"Prolia Injection Phone Screen      Screening questions have been asked 2-3 days prior to administration visit for Prolia. If any questions are answered with \"Yes,\" this phone encounter were will routed to ordering provider for further evaluation.     1.  When was the last injection?  10/18/18    2.  Has insurance for this injection been verified?  Yes    3.  Did you experience any new onset achiness or rashes that lasted for over a month with your previous Prolia injection?   No    4.  Do you have a fever over 101?F or a new deep cough that is unusual for you today? No    5.  Have you started any new medications in the last 6 months that you were told could affect your immune system? These may have been prescribed by oncologist, transplant, rheumatology, or dermatology.   No    6.  In the last 6 months have you have gastric bypass or parathyroid surgery?   No    7.  Do you plan dental work requiring drilling into the bone such as implants/extractions or oral surgery in the next 2-3 months?   No    Patient informed if symptoms discussed above present prior to their administration appointment, they are to notify clinic immediately.     Consuelo Lowry                 "

## 2021-05-29 ENCOUNTER — RECORDS - HEALTHEAST (OUTPATIENT)
Dept: ADMINISTRATIVE | Facility: CLINIC | Age: 76
End: 2021-05-29

## 2021-05-30 ENCOUNTER — RECORDS - HEALTHEAST (OUTPATIENT)
Dept: ADMINISTRATIVE | Facility: CLINIC | Age: 76
End: 2021-05-30

## 2021-05-30 VITALS — WEIGHT: 193 LBS | HEIGHT: 60 IN | BODY MASS INDEX: 37.89 KG/M2

## 2021-05-30 VITALS — WEIGHT: 193 LBS | BODY MASS INDEX: 37.89 KG/M2 | HEIGHT: 60 IN

## 2021-06-01 ENCOUNTER — RECORDS - HEALTHEAST (OUTPATIENT)
Dept: ADMINISTRATIVE | Facility: CLINIC | Age: 76
End: 2021-06-01

## 2021-06-02 VITALS — HEIGHT: 60 IN | WEIGHT: 165 LBS | BODY MASS INDEX: 32.39 KG/M2

## 2021-06-02 NOTE — TELEPHONE ENCOUNTER
"Prolia Injection Phone Screen      Screening questions have been asked 2-3 days prior to administration visit for Prolia. If any questions are answered with \"Yes,\" this phone encounter were will routed to ordering provider for further evaluation.     1.  When was the last injection?  04/24/19    2.  Has insurance for this injection been verified?  Yes    3.  Did you experience any new onset achiness or rashes that lasted for over a month with your previous Prolia injection?   No    4.  Do you have a fever over 101?F or a new deep cough that is unusual for you today? No    5.  Have you started any new medications in the last 6 months that you were told could affect your immune system? These may have been prescribed by oncologist, transplant, rheumatology, or dermatology.   No    6.  In the last 6 months have you have gastric bypass or parathyroid surgery?   No    7.  Do you plan dental work requiring drilling into the bone such as implants/extractions or oral surgery in the next 2-3 months?   No    Patient informed if symptoms discussed above present prior to their administration appointment, they are to notify clinic immediately.     Consuelo Lowry               "

## 2021-06-02 NOTE — PROGRESS NOTES
The following steps were completed to comply with the REMS program for Prolia:  1. Ordering provider has previously reviewed information in the Medication Guide and Patient Counseling Chart, including the serious risks of Prolia  and the symptoms of each risk and have been advised to seek prompt medical attention if they have signs or symptoms of any of the serious risks.  2. Provided each patient a copy of the Medication Guide and Patient Brochure.  See MAR for administration details.   Indication: Prolia  (denosumab) is a prescription medicine used to treat osteoporosis in patients who:   Are at high risk for fracture, meaning patients who have had a fracture related to osteoporosis, or who have multiple risk factors for fracture; Cannot use another osteoporosis medicine or other osteoporosis medicines did not work well.   The timeline for early/late injections would be 4 weeks early and any time after the 6 month melinda. If a patient receives their injection late, then the subsequent injection would be 6 months from the date that they actually received the injection    Have the screening questions been asked prior to this administration? Yes       Mrs. Moulton arrived at clinic for Prolia injection given Subcutaneous at Left arm.      Injection was given without incidence.      May DOMINGUEZ CMA/JESSIE....................1:34 PM

## 2021-06-04 VITALS
DIASTOLIC BLOOD PRESSURE: 60 MMHG | BODY MASS INDEX: 29.45 KG/M2 | HEIGHT: 60 IN | HEART RATE: 88 BPM | WEIGHT: 150 LBS | SYSTOLIC BLOOD PRESSURE: 112 MMHG | OXYGEN SATURATION: 94 %

## 2021-06-07 NOTE — TELEPHONE ENCOUNTER
Test Results  Who is calling?:  [facility]     Who ordered the test:  Ольга HOFFMANN     Type of test: Imaging     Where was the test performed:  [facility]     What are your questions/concerns?:  Ordering provider asked for Radiology call in results before patient left facility today . Caller was placed in huddle for clinic to address.     Okay to leave a detailed message?:  No    [phone]

## 2021-06-07 NOTE — TELEPHONE ENCOUNTER
If patient is describing black stools, this implies that she is having a GI bleed and ER evaluation is appropriate.

## 2021-06-07 NOTE — TELEPHONE ENCOUNTER
Orders being requested: Home Care  Skilled Nursing    1 time a week x 1 week  (for today's visit)    1 time a week for 5 weeks    1-7 PRN visits  Physical Therapy    Evaluate and treat  Reason service is needed/diagnosis: Management of picc line/dressing changes, lab draws, cardiopulmonary assessments and medication management.  When are orders needed by: ASAP seen patient today  Where to send Orders: Phone:  verbal orders to caller  Okay to leave detailed message?  Yes

## 2021-06-07 NOTE — TELEPHONE ENCOUNTER
Spoke with Cami at [facility] and relayed message below from Dr. Ortiz.  She verbalized understanding and had no further questions at this time.  May DOMINGUEZ, MESFIN/JESSIE....................2:39 PM

## 2021-06-07 NOTE — TELEPHONE ENCOUNTER
RN triage   Called pt w/ PCP advice -- pt will have  take her back to [facility] now ---   Pt expresses thanks to PCP.  Leticia Dominguez RN [facility] RN triage

## 2021-06-07 NOTE — TELEPHONE ENCOUNTER
RN triage  Call from pt   Pt states she was in the hospital at [facility] -- from 4/5- 4/13   State she had fever and chills --   States she had diarrhea 4-5 x /day for the past  2 weeks   Still having diarrhea -- x2 today   No fever or chills for 3-4 days   negative covid 19  U.O. OK  No abd pain   Pt on ceftriaxone / prescribed when in hospital   Today stool is black --   Per protocol = should go to ED   Pt does not want to go to ED   Please advise  when and where do you want pt seen ?  Leticia Dominguez RN [facility] RN triage        Reason for Disposition    Bloody, black, or tarry bowel movements    Protocols used: RECTAL BLEEDING-A-OH

## 2021-06-07 NOTE — PROGRESS NOTES
"Francoise Moulton is a 74 y.o. female who is being evaluated via a billable telephone visit.      The patient has been notified of following:     \"This telephone visit will be conducted via a call between you and your physician/provider. We have found that certain health care needs can be provided without the need for a physical exam.  This service lets us provide the care you need with a short phone conversation.  If a prescription is necessary we can send it directly to your pharmacy.  If lab work is needed we can place an order for that and you can then stop by our lab to have the test done at a later time.    Telephone visits are billed at different rates depending on your insurance coverage. During this emergency period, for some insurers they may be billed the same as an in-person visit.  Please reach out to your insurance provider with any questions.    If during the course of the call the physician/provider feels a telephone visit is not appropriate, you will not be charged for this service.\"    Patient has given verbal consent to a Telephone visit? Yes        Additional provider notes:   74-year-old woman with history of osteoporosis, hypercholesterolemia, MGUS, SVT and osteoarthritis recently hospitalized with gram-negative sepsis with blood cultures growing H. parainfluenzae.  Echocardiogram showing vegetations on mitral valve consistent with endocarditis.  Discharged with plans to treat with another 4 weeks with IV ceftriaxone.  Return to the ER with complaints of dark stools with possible melena.  However, hemoglobin stable.  She has had no further dark stools and hemoglobin improving to 9.7 when checked earlier this week.  Other labs including renal function and LFTs are normal.  She is feeling better and getting her strength back.  She has many questions regarding her follow-up.  She currently does not have any appointment scheduled with infectious disease or cardiology.    Assessment/Plan:  1. Acute " bacterial endocarditis  Hospitalized with gram-negative sepsis found to have vegetations on mitral valve consistent with endocarditis.  Blood cultures growing H parainfluenza.  Discharged with PICC line and plans for another 4 weeks of IV antibiotics with ceftriaxone.  She will be completing her second week on Monday, April 27.  She is feeling steadily better although is experiencing some loose stools with antibiotic.  I am recommending that she take probiotics including eating yogurt.  C. difficile has been checked and is negative.  Recent labs stable.  We will help make arrangements for follow-up with Dr. Ramirez with infectious disease who saw her during her hospitalization and will also help schedule follow-up appointment with cardiology.  - Ambulatory referral to Infectious Disease  - Ambulatory referral to Cardiology        Phone call duration:  18 minutes    Gerardo Ortiz MD

## 2021-06-07 NOTE — TELEPHONE ENCOUNTER
Who is calling:    Spouse    Reason for Call:    shortness of breath, chills.  Admitted to ICU at [facility] on 04/05/2020  On O2.    Date of last appointment with primary care: 10/22/2018    Okay to leave a detailed message: Yes    [facility] care team nurse Ph: [phone]    Please call [place] to get on update on patients health and report back to spouse.

## 2021-06-07 NOTE — TELEPHONE ENCOUNTER
Louann RN - Home Health RN    Inner Lt Thigh lump, red, warm, tender to touch    No difference in color, temperature, dependent pulses     Care advice as noted.     Toma Strickland RN    Reason for Disposition    Thigh or calf pain and only 1 side and present > 1 hour    Protocols used: LEG SWELLING AND EDEMA-A-OH

## 2021-06-07 NOTE — PROGRESS NOTES
"Francoise Moulton is a 74 y.o. female who is being evaluated via a billable telephone visit.      The patient has been notified of following:     \"This telephone visit will be conducted via a call between you and your physician/provider. We have found that certain health care needs can be provided without the need for a physical exam.  This service lets us provide the care you need with a short phone conversation.  If a prescription is necessary we can send it directly to your pharmacy.  If lab work is needed we can place an order for that and you can then stop by our lab to have the test done at a later time.    Telephone visits are billed at different rates depending on your insurance coverage. During this emergency period, for some insurers they may be billed the same as an in-person visit.  Please reach out to your insurance provider with any questions.    If during the course of the call the physician/provider feels a telephone visit is not appropriate, you will not be charged for this service.\"    Patient has given verbal consent to a Telephone visit? Yes    Patient would like to receive their AVS by AVS Preference: Coreen.    Additional provider notes:    Ms Moulton was hospitalized from 4/5/20 to 4/13/20 for gram negative bacteremia, currently on CTX for a total of 4 weeks via PICC managed by her .  Reports that her inner left thigh is tender to touch, red and warm with a palpable lump per home health RN. States that 4 nights ago after dinner, she noticed the issue on the left side of her left leg. The area is quite a large, hard to touch and about the size of a silver dollar.  Her daughter is a nurse and recommended the home health nurse evaluated. When the home health nurse inspected the area, she was concerned for a DVT and wanted to be evaluated by ultrasound.  No pain with walking, and unsure of swelling distal to the left leg.  Reportedly pulses are symmetric bilaterally.  Never had a blood " clot before.  Endorses being active since returning home. Taking APAP for pain. Endorses night sweats since she has been sick, but had not had one for 2 weeks until a couple nights ago. Labs from 4/20/20.  Using acetaminophen for pain with good relief    Assessment/Plan:  1. Pain of left lower leg  2. Leg lesion  Status post recent hospitalization for endocarditis. Infection such as an abscess in the setting of being on IV antibiotics remains on differential.  Hospitalization plus likely decreased activity in the setting of acute illness makes DVT is a possibility.  We will try to expedite imaging noted to come to the understanding of etiology of the lesion and pain.  - US Venous Leg Left; Future    Phone call duration:  15 minutes

## 2021-06-07 NOTE — TELEPHONE ENCOUNTER
Left message to call back for: Janeen  Information to relay to patient:  Please relay message below from Dr. Ortiz.  Thank you.  May DOMINGUEZ, MESFIN/CMT....................4:07 PM

## 2021-06-08 NOTE — TELEPHONE ENCOUNTER
Spoke with the patient and relayed message below from Dr. Ortiz.  She verbalized understanding, but would like to hold off on a phone visit with Dr. Ortiz at this time, as she believes that her symptoms will resolve once she is off one of her current medications.  Patient states that she will call if the issues don't resolve once that medication has been completed.    May DOMINGUEZ CMA/JESSIE....................1:01 PM

## 2021-06-08 NOTE — TELEPHONE ENCOUNTER
If she is having ongoing bothersome symptoms, I would recommend scheduling a video visit or telephone visit for this afternoon to further discuss

## 2021-06-08 NOTE — TELEPHONE ENCOUNTER
FYI - Status Update  Who is Calling: Home Care  Update: Gaa stated the patient informed the caller that the patient has discomfort in her left upper quadrant abdomen for 2 weeks now. Caller stated the patient also complained of having loose stools. Caller stated she does not need a call back but to contact the patient if Gerardo Ortiz MD wishes to do anything about this.  Okay to leave a detailed message?:  No return call needed

## 2021-06-09 NOTE — TELEPHONE ENCOUNTER
Dr. Ortiz,    Spoke with patient and let her know we do not do the COVID swabs at this location.    Let her know she can do [place] OR [facility].    Her ECHO is scheduled with [facility] on 7/20/20 and she needs her test at least 5 days before the procedure.    Let her know to have testing done with our system it needs to be ordered by one of our providers OR it needs to be arranged by the provider requesting the test.    Patient will discuss with you on 7/8.    Shruti PONCE LPN .......... 3:33 PM  06/30/20  [facility]

## 2021-06-09 NOTE — TELEPHONE ENCOUNTER
RN Triage:    Patient calling as she has an appointment with PCP on 7/8/2020 and seeing her cardiologist on 7/20 for an echocardiogram    Cardiologist is requiring a COVID test prior to echo    Patient wants COVID test to be done the same day as her appointment with PCP on 7/8    Was in the hospital back in April and was negative twice then, but needs a more recent test    Plan:  Will send message to Dr. Ortiz's team to schedule a COVID test for patient while at appointment on 7/8    [facility] contact number is [phone]    Zamzam Solano RN     Reason for Disposition    Nursing judgment    Protocols used: NO GUIDELINE OR REFERENCE [id number]

## 2021-06-09 NOTE — TELEPHONE ENCOUNTER
The doctor scheduling her procedure should be ordering this test.  If her cardiologist is not able to order the test, I will make arrangements for her to get tested for COVID 5 days prior to her echocardiogram.

## 2021-06-09 NOTE — TELEPHONE ENCOUNTER
----- Message from Gerardo Ortiz MD sent at 7/20/2020  8:12 AM CDT -----  Please tell patient that her COVID-19 testing was negative and make sure this results get sent over to [facility] cardiology where she is scheduled to have some cardiac procedures this week

## 2021-06-09 NOTE — PROGRESS NOTES
"Francoise Moulton is a 75 y.o. female who is being evaluated via a billable telephone visit.      The patient has been notified of following:     \"This telephone visit will be conducted via a call between you and your physician/provider. We have found that certain health care needs can be provided without the need for a physical exam.  This service lets us provide the care you need with a short phone conversation.  If a prescription is necessary we can send it directly to your pharmacy.  If lab work is needed we can place an order for that and you can then stop by our lab to have the test done at a later time.    Telephone visits are billed at different rates depending on your insurance coverage. During this emergency period, for some insurers they may be billed the same as an in-person visit.  Please reach out to your insurance provider with any questions.    If during the course of the call the physician/provider feels a telephone visit is not appropriate, you will not be charged for this service.\"    Patient has given verbal consent to a Telephone visit? Yes    What phone number would you like to be contacted at? [phone]    Patient would like to receive their AVS by AVS Preference: Coreen.    Additional provider notes: See separate dictation with annual wellness visit          Phone call duration:  30 minutes    Call began at 10:41 AM and call ended 11:11 AM    Gerarod Ortiz MD    "

## 2021-06-09 NOTE — PROGRESS NOTES
Assessment and Plan:       1. Medicare annual wellness visit, subsequent  Immunizations are reviewed and recommending Shingrix and annual flu shot. Living will discussed.  Non-smoker.  Uses alcohol in moderation.  Regular exercise discussed.  She is due for a colonoscopy.  Breast exam completed last week and she will continue to get a mammogram annually.   Last DEXA was 2017 and this should be repeated this year. Dementia and depression screening completed.  She sees an ophthalmologist every year. Skin exam performed last week and recommending regular use of sunblock.  Screened for diabetes with normal fasting glucose. .     2. Hypercalcemia  Found to have calcium 11.7 associated with elevated vitamin D level.  Instructed to discontinue both vitamin D and calcium supplements and have calcium rechecked in 2 weeks.  If persistently elevated, will look for secondary causes including checking for hyperparathyroidism and multiple myeloma especially in the setting of history of MGUS and chronic anemia    3. Age-related osteoporosis without current pathological fracture  She is completed 5 years of Prolia and needs DEXA to confirm response to therapy.  As above, will hold calcium and vitamin D for the next 2 weeks and if calcium returns to normal level, resume calcium at lower dose.    4. Monoclonal gammopathy of undetermined significance  Waiting for results of immunostain    5. Anemia, unspecified type  Hemoglobin is improving at 11.1 but still low we will check appropriate labs including iron studies and vitamin B12  - Iron and Transferrin Iron Binding Capacity; Future  - Vitamin B12; Future    6. Encounter for screening for malignant neoplasm of colon    - Ambulatory referral for Colonoscopy        The patient's current medical problems and family history were reviewed.    Over 15 minutes was spent addressing these chronic and new medical problems beyond time spent performing annual wellness visit with over 50% of  the time spent counseling and coordination of care    I have had an Advance Directives discussion with the patient.  The following health maintenance schedule was reviewed with the patient and provided in printed form in the after visit summary:   Health Maintenance   Topic Date Due     ZOSTER VACCINES (2 of 3) 10/25/2007     DXA SCAN  04/24/2019     MEDICARE ANNUAL WELLNESS VISIT  10/22/2019     INFLUENZA VACCINE RULE BASED (1) 08/01/2020     FALL RISK ASSESSMENT  07/14/2021     ADVANCE CARE PLANNING  10/22/2023     LIPID  07/08/2025     TD 18+ HE  10/22/2028     PNEUMOCOCCAL IMMUNIZATION 65+ LOW/MEDIUM RISK  Completed        Subjective:   Chief Complaint: Francoise Moulton is an 75 y.o. female here for an Annual Wellness visit.   HPI: In addition to annual wellness visit, several medical concerns addressed during today's visit.  We discussed results from her office visit.    Found to have hypercalcemia with level 11.7 associated with vitamin D above the therapeutic range.  She does take 500 mg of calcium 3 times daily and has been on vitamin D daily.  She also has history of MGUS and awaiting results of immunofixation.    There is persistent anemia although improved from earlier this year.  No history of melena or hematochezia.    We discussed diagnosis of MGUS    Review of Systems:    Please see above.  The rest of the review of systems are negative for all systems.    Patient Care Team:  Gerardo Ortiz MD as PCP - General (Internal Medicine)  Gerardo Ortiz MD as Assigned PCP     Patient Active Problem List   Diagnosis     Coronary atherosclerosis due to calcified coronary lesion     SVT (supraventricular tachycardia) (H)     Monoclonal gammopathy of undetermined significance     Varicose veins     Macrocytosis     Hypercholesterolemia     Diaphragmatic hernia     Osteoarthritis of right hip     Chronic low back pain without sciatica     Acute bacterial endocarditis     Osteoporosis      Hypercalcemia     Past Medical History:   Diagnosis Date     Acute bacterial endocarditis 04/24/2020    Hospitalized with gram-negative sepsis found to have vegetation on mitral valve consistent with endocarditis receiving 6 weeks of IV antibiotics with ceftriaxone     ASCVD (arteriosclerotic cardiovascular disease)     CT angiogram with nonobstructive coronary artery disease February 2015 following abnormal stress test     Chronic low back pain without sciatica 4/17/2017     Chronic sore throat 2015    GERD suspected Fosamax discontinued     Diaphragmatic hernia     Loop of bowel in chest possibility contributing to chronic dyspnea 2015     Disequilibrium     2007 [facility]     Dyspnea on exertion     Dr. Wilfrido Travis 2015.  Echocardiogram April 2017 with normal left ventricular systolic function EF 55% with mild aortic stenosis     Epigastric abdominal pain 04/17/2017    Vague abdominal symptoms, ultrasound with cholelithiasis, discussed evaluation by surgery and she will consider     Hypercalcemia 7/9/2020     Hypercholesterolemia      Inverted nipple 1994     Macrocytosis     B12 and folic acid checked 2015     Microscopic hematuria     Negative workup including negative IVP     Monoclonal gammopathy of undetermined significance     Dr. Jessica ECHAVARRIA     Osteoarthritis of right hip 4/17/2017     Osteoporosis     Tibial plateau fracture DEXA 2013 T score -2.1, began Prolia early 2015 previously on Fosamax discontinued because of increasing reflux, follow-up DEXA April 2017 with T score -1.8 left femoral neck and radius demonstrating improvement.  Completed Prolia 2020      Pain in and around eye, right 10/22/2018     Pulmonary nodule 04/17/2017    CT scan February 2015.  No nodules identified on CT 2020     SVT (supraventricular tachycardia) (H)     History of ablation     Varicose veins 4/15/2016      Past Surgical History:   Procedure Laterality Date     COLONOSCOPY      Normal 2010     SVT ablation        TONSILLECTOMY       VARICOSE VEIN SURGERY        Family History   Problem Relation Age of Onset     Heart disease Mother         90 yo     Lung cancer Father         79 yo     Cancer Sister         Bladder     No Medical Problems Daughter      Coronary artery disease Maternal Grandmother      No Medical Problems Maternal Grandfather      No Medical Problems Paternal Grandmother      No Medical Problems Paternal Grandfather      No Medical Problems Maternal Aunt      No Medical Problems Paternal Aunt      Breast cancer Cousin 70     Breast cancer Cousin 50     Breast cancer Cousin 40     Atrial fibrillation Sister      BRCA 1/2 Neg Hx      Colon cancer Neg Hx      Endometrial cancer Neg Hx      Ovarian cancer Neg Hx       Social History     Socioeconomic History     Marital status:      Spouse name: Not on file     Number of children: Not on file     Years of education: Not on file     Highest education level: Not on file   Occupational History     Not on file   Social Needs     Financial resource strain: Not on file     Food insecurity     Worry: Not on file     Inability: Not on file     Transportation needs     Medical: Not on file     Non-medical: Not on file   Tobacco Use     Smoking status: Former Smoker     Last attempt to quit: 1985     Years since quittin.4     Smokeless tobacco: Never Used   Substance and Sexual Activity     Alcohol use: Yes     Comment: rare     Drug use: Not on file     Sexual activity: Not on file   Lifestyle     Physical activity     Days per week: Not on file     Minutes per session: Not on file     Stress: Not on file   Relationships     Social connections     Talks on phone: Not on file     Gets together: Not on file     Attends Jain service: Not on file     Active member of club or organization: Not on file     Attends meetings of clubs or organizations: Not on file     Relationship status: Not on file     Intimate partner violence     Fear of current or ex  partner: Not on file     Emotionally abused: Not on file     Physically abused: Not on file     Forced sexual activity: Not on file   Other Topics Concern     Not on file   Social History Narrative    Retired, [employer]     , 3 children      Current Outpatient Medications   Medication Sig Dispense Refill     acetaminophen (TYLENOL) 650 MG CR tablet Take 1,300 mg by mouth every 8 (eight) hours as needed for pain.       MULTIVIT &MINERALS/FERROUS FUM (MULTI VITAMIN ORAL) Take by mouth.       amoxicillin (AMOXIL) 500 MG capsule Take 4 capsules (2,000 mg total) by mouth once for 1 dose. 1 hour prior to dental appointments 4 capsule 5     CALCIUM CARBONATE (CALCIUM 500 ORAL) Take 500 mg by mouth 3 (three) times a day.        No current facility-administered medications for this visit.       Objective:   Vital Signs:   Visit Vitals  LMP 01/05/1992        Weight: Unable to obtain due to video visit  Height: Unable to obtain due to video visit  BMI: Unable to obtain due to video visit  Blood Pressure: Unable to obtain due to video visit      VisionScreening:  No exam data present     PHYSICAL EXAM      Assessment Results 7/14/2020   Activities of Daily Living No help needed   Instrumental Activities of Daily Living No help needed   Get Up and Go Score -   Mini Cog Total Score 5   Some recent data might be hidden     A Mini-Cog score of 0-2 suggests the possibility of dementia, score of 3-5 suggests no dementia        Identified Health Risks:     She is at risk for lack of exercise and has been provided with information to increase physical activity for the benefit of her well-being.  The patient was counseled and encouraged to consider modifying their diet and eating habits. She was provided with information on recommended healthy diet options.  The patient was provided with written information regarding signs of hearing loss.  Information on urinary incontinence and treatment options given to patient.  Patient's  advanced directive was discussed and I am comfortable with the patient's wishes.

## 2021-06-09 NOTE — PROGRESS NOTES
Office Visit - Follow Up   Francoise Moulton   75 y.o. female    Date of Visit: 7/8/2020    Chief Complaint   Patient presents with     Follow-up     orders     Covid test, needs per cardiologist, seeing him on July 20        Assessment and Plan   1. Acute bacterial endocarditis  Hospitalized earlier this year with sepsis secondary to acute bacterial endocarditis treated with 6 weeks of IV antibiotics with plans to repeat TEMITOPE later this month.  - amoxicillin (AMOXIL) 500 MG capsule; Take 4 capsules (2,000 mg total) by mouth once for 1 dose. 1 hour prior to dental appointments  Dispense: 4 capsule; Refill: 5    2. Age-related osteoporosis without current pathological fracture  She is completed 5 years of Prolia and will need follow-up DEXA to establish new baseline.  We will plan to monitor every 2 years.  - Vitamin D, Total (25-Hydroxy)  - DXA Bone Density Scan; Future    3. SVT (supraventricular tachycardia) (H)  History of ablation without recurrence    4. Fatigue, unspecified type  Multifactorial including as she recovers from prolonged hospitalization with acute bacterial endocarditis.  However, will check other metabolic studies.  Significant anemia was noted with hemoglobin down to 9.3 in May.  - Comprehensive Metabolic Panel  - HM2(CBC w/o Differential)  - Thyroid Stimulating Hormone (TSH)    5. Coronary atherosclerosis due to calcified coronary lesion associated with hypercholesterolemia  Previously could not tolerate atorvastatin.  She would benefit from trying a different statin.  Recheck lipid profile and further discuss when results are available      - Lipid Cascade FASTING    7. Monoclonal gammopathy of undetermined significance  Previously followed by [facility].   - Electrophoresis, Protein, Serum    8. COVID-19  She needs COVID-19 testing prior to having TEMITOPE completed  - Asymptomatic COVID-19 Virus (CORONAVIRUS) PCR; Future    9. Encounter for screening mammogram for breast cancer    - Mammo Screening  Bilateral; Future    Return in about 6 days (around 7/14/2020) for Annual physical.     History of Present Illness   This 75 y.o. old woman with history of MGUS, osteoporosis, history of SVT and hypercholesterolemia with coronary arteries.  Hospitalized earlier this year with sepsis and acute bacterial endocarditis.  Treated with IV antibiotics for 6 weeks.  Last TEMITOPE May 1 showing persistent vegetation involving leaflet of mitral valve with plans to a TEMITOPE later this month.  She denies any chest pain or dyspnea.  No fevers or chills.  Continues to feel low energy and gets fatigued easily.  Denies shortness of breath.  Noted to have significant anemia during hospitalization with hemoglobin down to 9.3.  No history of melena or hematochezia.  Attributed to acute illness.  She does have history of MGUS.  I do not see an SPEP recently checked.  She does not recall seeing hematology the past 2 years.  We discussed other chronic medical problems.  She has osteoporosis and has completed 5 years of Prolia.  Her last DEXA in 2017 did show response to the medication and she needs a follow-up DEXA to establish new baseline.  She is also overdue for her mammogram.    Review of Systems:  Otherwise, a comprehensive review of systems was negative except as noted.     Medications, Allergies and Problem List   Patient Active Problem List   Diagnosis     Coronary atherosclerosis due to calcified coronary lesion     SVT (supraventricular tachycardia) (H)     Monoclonal gammopathy of undetermined significance     Varicose veins     Macrocytosis     Hypercholesterolemia     Diaphragmatic hernia     Osteoarthritis of right hip     Chronic low back pain without sciatica     Acute bacterial endocarditis     Osteoporosis       She has a past surgical history that includes SVT ablation; Tonsillectomy; Varicose vein surgery; and Colonoscopy.    Allergies   Allergen Reactions     Atorvastatin Headache     Patient states she had severe headaches      Fosamax [Alendronate]      Increasing reflux, chronic sore throat     Hydrocodone-Acetaminophen Other (See Comments)     Patient states she got sick to her stomach.       Current Outpatient Medications   Medication Sig Dispense Refill     acetaminophen (TYLENOL) 650 MG CR tablet Take 1,300 mg by mouth every 8 (eight) hours as needed for pain.       amoxicillin (AMOXIL) 500 MG capsule Take 4 capsules (2,000 mg total) by mouth once for 1 dose. 1 hour prior to dental appointments 4 capsule 5     CALCIUM CARBONATE (CALCIUM 500 ORAL) Take 500 mg by mouth 3 (three) times a day.        CHOLECALCIFEROL, VITAMIN D3, (VITAMIN D3 ORAL) Take by mouth.       MULTIVIT &MINERALS/FERROUS FUM (MULTI VITAMIN ORAL) Take by mouth.       No current facility-administered medications for this visit.         Physical Exam   General Appearance:   Well-appearing elderly woman    /60 (Patient Site: Left Arm, Patient Position: Sitting, Cuff Size: Adult Large)   Pulse 88   Ht 5' (1.524 m)   Wt 150 lb (68 kg)   LMP 1992   SpO2 94%   BMI 29.29 kg/m      HEENT: Normal  Neck without lymphadenopathy or thyromegaly  Breast: No palpable masses  Respiratory: Normal respiratory effort.  Lungs are clear with no rales or wheezes.  Heart: Regular rate and rhythm with 2/6 systolic murmur.  No carotid bruits.  Abdomen: Abdomen is soft, nontender without guarding, rebound, masses, or hepatosplenomegaly.  Extremities: No peripheral edema.  Good pedal pulses  Neurologic: Grossly nonfocal  Skin: No cyanosis or pallor.  No skin lesions.  Psych: Alert and oriented ×3, mood appropriate         Additional Information   Social History     Tobacco Use     Smoking status: Former Smoker     Last attempt to quit: 1985     Years since quittin.3     Smokeless tobacco: Never Used   Substance Use Topics     Alcohol use: Yes     Comment: rare     Drug use: Not on file         Review and/or order of clinical lab tests: Ordering CMP, CBC, SPEP,  lipid profile, TSH and vitamin D  Review and/or order of radiology tests: Ordering DEXA and mammogram  Review and/or order of medicine tests: Reviewed last TEMITOPE from May 1 showing persistent vegetations on mitral valve leaflet    Review and summarization of old records and/or obtaining history from someone other than the patient and.or discussion of case with another health care provider: Reviewed extensive outside records from cardiology and infectious disease following hospitalization with bacterial endocarditis.  Plans for follow-up TEMITOPE in late July.  Completed 6 weeks of IV antibiotics.  Also reviewed ER evaluation on May 6, 2020 presenting with recurrent chest pain with negative evaluation including normal troponin, normal chest x-ray and EKG without acute changes.       Gerardo Ortiz MD

## 2021-06-10 NOTE — TELEPHONE ENCOUNTER
Patient is already scheduled for her DXA on 8/3/20 @ 10:30am with [facility]. The order was released to [facility] on 7/8/20 and pt was called by MR on 7/9/20 and scheduled this appointment.

## 2021-06-10 NOTE — TELEPHONE ENCOUNTER
Type of referral:  Colonoscopy for colon cancer screening  What provider or facility are you being referred to?  She does not know. Do you have an appointment scheduled?  No  What is your question?  what like a recommendation from pcp and possible help scheduling it.  Okay to leave a detailed message: Yes

## 2021-06-10 NOTE — TELEPHONE ENCOUNTER
Spoke with the patient and relayed message below.  She verbalized understanding and had no further questions at this time.  May DOMINGUEZ CMA/JESSIE....................4:06 PM

## 2021-06-10 NOTE — TELEPHONE ENCOUNTER
Dr. Ortiz,  Please review message below and advise.  It looks like the patient's last prolia was 10/31/19.  There has been no referral for prolia to verify the patient's insurance.  Please advise.  Thank you.  May DOMINGUEZ CMA/JESSIE....................3:33 PM

## 2021-06-10 NOTE — TELEPHONE ENCOUNTER
Who is calling:  patient  Reason for Call:  Wondering if ok with pcp that she have another prolia injection done at the [place] office. Please acall and advise.   Date of last appointment with primary care: 07/14/2020  Okay to leave a detailed message: Yes

## 2021-06-10 NOTE — TELEPHONE ENCOUNTER
I told her at her visit that her Prolia injections are now completed.  She will DEXA later this year

## 2021-06-10 NOTE — TELEPHONE ENCOUNTER
Spoke with the patient and relayed message below from Dr. Ortiz.  She verbalized understanding and had no further questions at this time.  Phone number for [facility] was given to the patient.  May DOMINGUEZ CMA/JESSIE....................4:08 PM     no chest pain, no cough, and no shortness of breath.

## 2021-06-10 NOTE — TELEPHONE ENCOUNTER
I already sent referral for colonoscopy at her visit in July.  If she has not heard from [facility], please provide their number.

## 2021-06-10 NOTE — PROGRESS NOTES
Assessment and Plan:       1. Medicare annual wellness visit, subsequent  Immunizations are reviewed and are up to date.  She declines having flu shots.  Living will discussed.  Non-smoker.  Uses alcohol in moderation.  Regular exercise discussed.  Up to date with colonoscopies and this should be repeated in 2020.  Breast exam completed and she will continue to get a mammogram annually.  Pelvic exam performed.  Last DEXA was 2013 and this should be repeated this year to follow-up response to Prolia.  Dementia and depression screening completed.  She sees an ophthalmologist every year and gets glaucoma screening.  Skin exam performed and recommending regular use of sunblock.  .  Will screen for diabetes with fasting glucose.      2. Osteoporosis  She is receiving her fifth dose of Prolia today.  Will make arrangements for follow-up DEXA to confirm good response to medication.  She continues on calcium and vitamin D supplements.  Will recheck level.  - Vitamin D, Total (25-Hydroxy)  - DXA Bone Density Scan; Future    3. Coronary atherosclerosis due to calcified coronary lesion  CT angiogram showing moderate amount of atherosclerosis with elevated coronary calcium score.  I am recommending she start a statin but she is reluctant she has had side effects previously.  I am also recommending aspirin 81 mg daily but she had some bruising side effects in the past.  We will see if she can tolerate aspirin 81 mg once weekly.  Rechecking lipid profile.  We discussed the benefits of statins including reducing the risk for cardiac events and strokes.    4. Dyspnea on exertion  I suspect related to diaphragmatic hernia.  She has had cardiac and pulmonary evaluation.  Some mild aortic stenosis is present and this will be reassessed.    5. Hypercholesterolemia  As above, recheck lipids and discussed the benefits of a statin.  Simvastatin recommended last year but she is reluctant because of past experience and potential side  effects  - Lipid Cascade    6. Mild aortic stenosis  We will make arrangements for follow-up echocardiogram to confirm stability  - Echo Complete; Future    7. Monoclonal gammopathy of undetermined significance  She will follow-up with her hematologist, Dr. Lopez this summer    8. SVT (supraventricular tachycardia)  Successful ablation without recurrent symptoms    9. Primary osteoarthritis of right hip  Worsening right hip pain.  Exam consistent with osteoarthritis involving hip.  She is not interested in pursuing hip replacement at this time.    10. Chronic low back pain without sciatica  She is trying to do some home exercises although they exacerbated her hip pain    11. Epigastric abdominal pain  More of a fullness in her epigastric area occurring intermittently.  She is concerned about gallstones.  Will make arrangements for right upper quadrant abdominal ultrasound and will check appropriate labs  - Comprehensive Metabolic Panel  - Hemoglobin  - Urinalysis  - US Abdomen Limited; Future    12. Pulmonary nodule  We will obtain scan performed at lung clinic documenting no change in nodule per her history.  Initially seen on CT scan February 2015    Over 25 minutes was spent addressing these chronic and new medical problems beyond time spent performing annual wellness visit with over 50% of the time spent counseling and coordination of care      The patient's current medical problems were reviewed.    I have had an Advance Directives discussion with the patient.  The following health maintenance schedule was reviewed with the patient and provided in printed form in the after visit summary:   Health Maintenance   Topic Date Due     DXA SCAN  05/29/2008     TD 18+ HE  04/15/2018     FALL RISK ASSESSMENT  04/17/2018     MAMMOGRAM  04/13/2019     COLONOSCOPY  01/18/2020     ADVANCE DIRECTIVES DISCUSSED WITH PATIENT  04/17/2022     PNEUMOCOCCAL POLYSACCHARIDE VACCINE AGE 65 AND OVER  Completed     INFLUENZA  VACCINE RULE BASED  Addressed     PNEUMOCOCCAL CONJUGATE VACCINE FOR ADULTS (PCV13 OR PREVNAR)  Completed     ZOSTER VACCINE  Completed        Subjective:   Chief Complaint: Francoise Moulton is an 71 y.o. female here for an Annual Wellness visit.   HPI: In addition to wellness visit, multiple chronic medical problems and new concerns addressed.  She has osteoporosis and has been on Prolia since 2015.  Tolerating without side effects.  History of tibial plateau fracture.  She is having increasing pain involving her right hip.  It was worse when trying to do her back exercises for chronic low back pain.  History of MGUS, followed by hematology and stable.  Also history of SVT with successful ablation without any recurrent palpitations.  Known mild aortic stenosis.  Also found to have atherosclerosis on CT angiogram.  Recommended to start simvastatin but she is reluctant to take a statin as she had previous side effects.  Denies any exertional chest pain but ongoing dyspnea with exertion.  Was evaluated by pulmonary.  Known diaphragmatic hernia likely responsible.  She is describing a fullness sensation in her epigastric region that occurs with certain foods including meat and bread.  No dysphagia.  No severe pain.  Gallstones were incidentally noted on previous scans and she is questioning whether this is her diagnosis.    Review of Systems:    Please see above.  The rest of the review of systems are negative for all systems.    Patient Care Team:  Gerardo Ortiz MD as PCP - General (Internal Medicine)     Patient Active Problem List   Diagnosis     Dyspnea on exertion     Coronary atherosclerosis due to calcified coronary lesion     Osteoporosis     SVT (supraventricular tachycardia)     Monoclonal gammopathy of undetermined significance     Varicose veins     Mild aortic stenosis     Macrocytosis     Hypercholesterolemia     Diaphragmatic hernia     Osteoarthritis of right hip     Chronic low back pain  without sciatica     Epigastric abdominal pain     Pulmonary nodule     Past Medical History:   Diagnosis Date     ASCVD (arteriosclerotic cardiovascular disease)     CT angiogram with nonobstructive coronary artery disease February 2015 following abnormal stress test     Chronic low back pain without sciatica 4/17/2017     Chronic sore throat 2015    GERD suspected Fosamax discontinued     Diaphragmatic hernia     Loop of bowel in chest possibility contributing to chronic dyspnea 2015     Disequilibrium     2007 [facility]     Dyspnea on exertion     Dr. Wilfrido Travis 2015      Epigastric abdominal pain 4/17/2017    Suspect gallstones     Hypercholesterolemia      Inverted nipple 1994     Macrocytosis     B12 and folic acid checked 2015     Microscopic hematuria     Negative workup including negative IVP     Mild aortic stenosis     Echo 2015     Monoclonal gammopathy of undetermined significance     Dr. Jessica ECHAVARRIA     Osteoarthritis of right hip 4/17/2017     Osteoporosis     Tibial plateau fracture DEXA 2013 T score -2.1, began Prolia early 2015 previously on Fosamax discontinued because of increasing reflux     Pulmonary nodule 4/17/2017    CT scan February 2015.  Obtain follow-up scan from Dr. Travis     SVT (supraventricular tachycardia)     History of ablation     Varicose veins 4/15/2016      Past Surgical History:   Procedure Laterality Date     COLONOSCOPY      Normal 2010     SVT ablation       TONSILLECTOMY       VARICOSE VEIN SURGERY        Family History   Problem Relation Age of Onset     Heart disease Mother      90 yo     Lung cancer Father      81 yo     Cancer Sister      Bladder     No Medical Problems Daughter      Coronary artery disease Maternal Grandmother      No Medical Problems Maternal Grandfather      No Medical Problems Paternal Grandmother      No Medical Problems Paternal Grandfather      No Medical Problems Maternal Aunt      No Medical Problems Paternal Aunt      Breast cancer Cousin  70     Breast cancer Cousin 50     Breast cancer Cousin 40     Atrial fibrillation Sister      BRCA 1/2 Neg Hx      Colon cancer Neg Hx      Endometrial cancer Neg Hx      Ovarian cancer Neg Hx       Social History     Social History     Marital status:      Spouse name: N/A     Number of children: N/A     Years of education: N/A     Occupational History     Not on file.     Social History Main Topics     Smoking status: Former Smoker     Quit date: 2/26/1985     Smokeless tobacco: Not on file     Alcohol use Yes      Comment: rare     Drug use: Not on file     Sexual activity: Not on file     Other Topics Concern     Not on file     Social History Narrative    Retired, [employer]     , 3 children      Current Outpatient Prescriptions   Medication Sig Dispense Refill     CALCIUM CARBONATE (CALCIUM 500 ORAL) Take 500 mg by mouth 3 (three) times a day.        CHOLECALCIFEROL, VITAMIN D3, (VITAMIN D3 ORAL) Take by mouth.       MULTIVIT &MINERALS/FERROUS FUM (MULTI VITAMIN ORAL) Take by mouth.       Current Facility-Administered Medications   Medication Dose Route Frequency Provider Last Rate Last Dose     denosumab 60 mg (PROLIA 60 mg/ml)  60 mg Subcutaneous Q6 Months Gerardo Ortiz MD   60 mg at 10/17/16 1101      Objective:   Vital Signs:   Visit Vitals     /72 (Patient Site: Right Arm, Patient Position: Sitting, Cuff Size: Adult Large)     Pulse 79     Ht 5' (1.524 m)     Wt 193 lb (87.5 kg)     LMP 01/05/1992     SpO2 93%     BMI 37.69 kg/m2            PHYSICAL EXAM  EYES: Eyelids, conjunctiva, and sclera were normal. Pupils were normal. Cornea, iris, and lens were normal bilaterally.  HEAD, EARS, NOSE, MOUTH, AND THROAT: Head and face were normal. Nose appearance was normal and there was no discharge. Oropharynx was normal.  NECK: Neck appearance was normal. There were no neck masses and the thyroid was not enlarged and no nodules are felt.  No lymphadenopathy.  RESPIRATORY: Breathing  pattern was normal and the chest moved symmetrically.  Percussion/auscultatory percussion was normal.  Lung sounds were normal and there were no rales or wheezes.  CARDIOVASCULAR: Heart rate and rhythm were normal.  2/6 systolic murmur.  Peripheral pulses in arms and legs were normal.  Jugular venous pressure was normal.  There was no peripheral edema.  No carotid bruits.  BREAST: No palpable masses or tenderness.  No axillary nodes.  GASTROINTESTINAL: The abdomen was normal in contour.  Bowel sounds were present.   Palpation detected no tenderness, mass, or enlarged organs.   PELVIC: No external lesions.    Uterus was normal size, shape, and consistency, without palpable lesions.  Adnexa were nontender without palpable mass.  MUSCULOSKELETAL: Skeletal configuration was normal and muscle mass was normal for age.  Decreased range of motion right hip including with rotation secondary to pain  LYMPHATIC: There were no enlarged nodes.  SKIN/HAIR/NAILS: Skin color was normal.  Hair and nails were normal.There were no skin lesions.  Varicose veins bilateral lower extremities.  NEUROLOGIC: The patient was alert and oriented to person, place, time, and circumstance. Speech was normal. Cranial nerves were normal. Motor strength was normal for age. The patient was normally coordinated.  Sensation intact.  PSYCHIATRIC:  Mood and affect were normal and the patient had normal recent and remote memory. The patient's judgment and insight were normal.    Assessment Results 4/17/2017   Activities of Daily Living No help needed   Instrumental Activities of Daily Living No help needed   Get Up and Go Score Less than 12 seconds   Mini Cog Total Score 5     A Mini-Cog score of 0-2 suggests the possibility of dementia, score of 3-5 suggests no dementia    Identified Health Risks:     She is at risk for lack of exercise and has been provided with information to increase physical activity for the benefit of her well-being.  Information  regarding advance directives (living quan), including where she can download the appropriate form, was provided to the patient via the AVS.

## 2021-06-10 NOTE — TELEPHONE ENCOUNTER
I want her DEXA done at the [facility] which is where she had her previous DEXA completed in 2017.  This is important for comparison purposes.  I did send the referral.  Please forward message to our schedulers.

## 2021-06-10 NOTE — TELEPHONE ENCOUNTER
Who is calling:  Francoise  Reason for Call:  Patient called stating she called [facility] to set up her DEXA & they said they didn't have an order for her. Please send order to [facility].  Date of last appointment with primary care: 7/14/2020  Okay to leave a detailed message: Yes

## 2021-06-15 PROBLEM — G89.29 CHRONIC LOW BACK PAIN WITHOUT SCIATICA: Status: ACTIVE | Noted: 2017-04-17

## 2021-06-15 PROBLEM — M54.50 CHRONIC LOW BACK PAIN WITHOUT SCIATICA: Status: ACTIVE | Noted: 2017-04-17

## 2021-06-15 PROBLEM — M16.11 OSTEOARTHRITIS OF RIGHT HIP: Status: ACTIVE | Noted: 2017-04-17

## 2021-06-16 PROBLEM — D64.9 ANEMIA: Status: ACTIVE | Noted: 2020-07-14

## 2021-06-16 PROBLEM — I33.0 ACUTE BACTERIAL ENDOCARDITIS: Status: ACTIVE | Noted: 2020-04-24

## 2021-06-17 NOTE — PROGRESS NOTES
Patient was administered Prolia injection without incident- see MAR for details  Alix Forte/JAYLEEN

## 2021-06-18 NOTE — PATIENT INSTRUCTIONS - HE
Patient Instructions by Gerardo Ortiz MD at 7/14/2020 10:40 AM     Author: Gerardo Ortiz MD Service: -- Author Type: Physician    Filed: 7/14/2020  8:06 PM Encounter Date: 7/14/2020 Status: Addendum    : Gerardo Ortiz MD (Physician)    Related Notes: Original Note by Gerardo Ortiz MD (Physician) filed at 7/14/2020  8:04 PM         Patient Education     Exercise for a Healthier Heart  You may wonder how you can improve the health of your heart. If youre thinking about exercise, youre on the right track. You dont need to become an athlete, but you do need a certain amount of brisk exercise to help strengthen your heart. If you have been diagnosed with a heart condition, your doctor may recommend exercise to help stabilize your condition. To help make exercise a habit, choose safe, fun activities.       Be sure to check with your health care provider before starting an exercise program.    Why exercise?  Exercising regularly offers many healthy rewards. It can help you do all of the following:    Improve your blood cholesterol levels to help prevent further heart trouble    Lower your blood pressure to help prevent a stroke or heart attack    Control diabetes, or reduce your risk of getting this disease    Improve your heart and lung function    Reach and maintain a healthy weight    Make your muscles stronger and more limber so you can stay active    Prevent falls and fractures by slowing the loss of bone mass (osteoporosis)    Manage stress better  Exercise tips  Ease into your routine. Set small goals. Then build on them.  Exercise on most days. Aim for a total of 150 or more minutes of moderate to  vigorous intensity activity each week. Consider 40 minutes, 3 to 4 times a week. For best results, activity should last for 40 minutes on average. It is OK to work up to the 40 minute period over time. Examples of moderate-intensity activity is walking one mile in 15 minutes or 30 to  45 minutes of yard work.  Step up your daily activity level. Along with your exercise program, try being more active throughout the day. Walk instead of drive. Do more household tasks or yard work.  Choose one or more activities you enjoy. Walking is one of the easiest things you can do. You can also try swimming, riding a bike, or taking an exercise class.  Stop exercising and call your doctor if you:    Have chest pain or feel dizzy or lightheaded    Feel burning, tightness, pressure, or heaviness in your chest, neck, shoulders, back, or arms    Have unusual shortness of breath    Have increased joint or muscle pain    Have palpitations or an irregular heartbeat      5602-6635 [employer]. [address] [place] [address], [place] [address]. All rights reserved. This information is not intended as a substitute for professional medical care. Always follow your healthcare professional's instructions.         Patient Education   Understanding [employer] MyPlate  The USDA (US Department of Agriculture) has guidelines to help you make healthy food choices. These are called MyPlate. MyPlate shows the food groups that make up healthy meals using the image of a place setting. Before you eat, think about the healthiest choices for what to put onto your plate or into your cup or bowl. To learn more about building a healthy plate, visit www.choosemyplate.gov.       The Food Groups    Fruits: Any fruit or 100% fruit juice counts as part of the Fruit Group. Fruits may be fresh, canned, frozen, or dried, and may be whole, cut-up, or pureed. Make half your plate fruits and vegetables.    Vegetables: Any vegetable or 100% vegetable juice counts as a member of the Vegetable Group. Vegetables may be fresh, frozen, canned, or dried. They can be served raw or cooked and may be whole, cut-up, or mashed. Make half your plate fruits and vegetables.     Grains: All foods made from grains are part of the Grains Group. These include wheat,  rice, oats, cornmeal, and barley such as bread, pasta, oatmeal, cereal, tortillas, and grits. Grains should be no more than a quarter of your plate. At least half of your grains should be whole grains.    Protein: This group includes meat, poultry, seafood, beans and peas, eggs, processed soy products (like tofu), nuts (including nut butters), and seeds. Make protein choices no more than a quarter of your plate. Meat and poultry choices should be lean or low fat.    Dairy: All fluid milk products and foods made from milk that contain calcium, like yogurt and cheese are part of the Dairy Group. (Foods that have little calcium, such as cream, butter, and cream cheese, are not part of the group.) Most dairy choices should be low-fat or fat-free.    Oils: These are fats that are liquid at room temperature. They include canola, corn, olive, soybean, and sunflower oil. Foods that are mainly oil include mayonnaise, certain salad dressings, and soft margarines. You should have only 5 to 7 teaspoons of oils a day. You probably already get this much from the food you eat.  Use [employer] to Help Build Your Meals  The SuperTracker can help you plan and track your meals and activity. You can look up individual foods to see or compare their nutritional value. You can get guidelines for what and how much you should eat. You can compare your food choices. And you can assess personal physical activities and see ways you can improve. Go to www.choosemyplate.gov/supertracker/.    9960-1959 [employer]. [address] [place] [address], [place] [address]. All rights reserved. This information is not intended as a substitute for professional medical care. Always follow your healthcare professional's instructions.           Patient Education   Signs of Hearing Loss  Hearing loss is a problem shared by many people. In fact, it is one of the most common health conditions, particularly as people age. Most people over age 65 have some  hearing loss, and by age 80, almost everyone does. Because hearing loss usually occurs slowly over the years, you may not realize your hearing ability has gotten worse.       Have your hearing checked  Contact your [facility] care provider if you:    Have to strain to hear normal conversation.    Have to watch other peoples faces very carefully to follow what theyre saying.    Need to ask people to repeat what theyve said.    Often misunderstand what people are saying.    Turn the volume of the television or radio up so high that others complain.    Feel that people are mumbling when theyre talking to you.    Find that the effort to hear leaves you feeling tired and irritated.    Notice, when using the phone, that you hear better with 1 ear than the other.    4922-1709 [employer]. [address] [place] [address]. All rights reserved. This information is not intended as a substitute for professional medical care. Always follow your healthcare professional's instructions.         Patient Education   Urinary Incontinence, Female (Adult)  Urinary incontinence means loss of control of the bladder. This problem affects many women, especially as they get older. If you have incontinence, you may be embarrassed to ask for help. But know that this problem can be treated.  Types of Incontinence  There are different types of incontinence. Two of the main types are described here. You can have more than one type.    Stress incontinence. With this type, urine leaks when pressure (stress) is put on the bladder. This may happen when you cough, sneeze, or laugh. Stress incontinence most often occurs because the pelvic floor muscles that support the bladder and urethra are weak. This can happen after pregnancy and vaginal childbirth or a hysterectomy. It can also be due to excess body weight or hormone changes.    Urge incontinence (also called overactive bladder). With this type, a sudden urge to urinate is felt  often. This may happen even though there may not be much urine in the bladder. The need to urinate often during the night is common. Urge incontinence most often occurs because of bladder spasms. This may be due to bladder irritation or infection. Damage to bladder nerves or pelvic muscles, constipation, and certain medicines can also lead to urge incontinence.  Treatment of urinary incontinence depends on the cause. Further evaluation is needed to find the type you have. This will likely include an exam and certain tests. Based on the results, you and your healthcare provider can then plan treatment. Until a diagnosis is made, the home care tips below can help relieve symptoms.  Home care    Do pelvic floor muscle exercises, if they are prescribed. The pelvic floor muscles help support the bladder and urethra. Many women find that their symptoms improve when doing special exercises that strengthen these muscles. To do the exercises contract the muscles you would use to stop your stream of urine, but do this when youre not urinating. Hold for 10 seconds, then relax. Repeat 10 to 20 times in a row, at least 3 times a day. Your provider may give you other instructions for how to do the exercises and how often.    Keep a bladder diary. This helps track how often and how much you urinate over a set period of time. Bring this diary with you to your next visit with the provider. The information can help your provider learn more about your bladder problem.    Lose weight, if advised to by your provider. Excess weight puts pressure on the bladder. Your provider can help you create a weight-loss plan thats right for you. This may include exercising more and making certain diet changes.    Don't consume foods and drinks that may irritate the bladder. These can include alcohol and caffeinated drinks.    Quit smoking. Smoking and other tobacco use can lead to chronic cough that strains the pelvic floor muscles. Smoking may also  damage the bladder and urethra. Talk with your provider about treatments or methods you can use to quit smoking.    If drinking large amounts of fluid causes you to have symptoms, you may be advised to limit your fluid intake. You may also be advised to drink most of your fluids during the day and to limit fluids at night.    If youre worried about urine leakage or accidents, you may wear absorbent pads to catch urine. Change the pads often. This helps reduce discomfort. It may also reduce the risk of skin or bladder infections.  Follow-up care  Follow up with your healthcare provider, or as directed. It may take some to find the right treatment for your problem. Your treatment plan may include special therapies or medicines. Certain procedures or surgery may also be options. Be sure to discuss any questions you have with your provider.  When to seek medical advice  Call the healthcare provider right away if any of these occur:    Fever of 100.4 F (38 C) or higher, or as directed by your provider    Bladder pain or fullness    Abdominal swelling    Nausea or vomiting    Back pain    Weakness, dizziness or fainting  Date Last Reviewed: 10/1/2017    3544-0331 [employer]. [address] [place] [address]. All rights reserved. This information is not intended as a substitute for professional medical care. Always follow your healthcare professional's instructions.       Advance Directive  Patients advance directive was discussed and I am comfortable with the patients wishes.  Patient Education   Personalized Prevention Plan  You are due for the preventive services outlined below.  Your care team is available to assist you in scheduling these services.  If you have already completed any of these items, please share that information with your care team to update in your medical record.  Health Maintenance   Topic Date Due   ? ZOSTER VACCINES (2 of 3) 10/25/2007   ? DXA SCAN  04/24/2019   ? INFLUENZA  VACCINE RULE BASED (1) 08/01/2020   ? MEDICARE ANNUAL WELLNESS VISIT  07/14/2021   ? FALL RISK ASSESSMENT  07/14/2021   ? LIPID  07/08/2025   ? ADVANCE CARE PLANNING  07/14/2025   ? TD 18+ HE  10/22/2028   ? PNEUMOCOCCAL IMMUNIZATION 65+ LOW/MEDIUM RISK  Completed         Recommending annual flu shot every fall    Recommending new shingles vaccine, Shingrix.  This can be at your pharmacy    Schedule DEXA to follow-up response to treatment of osteoporosis    New annual mammograms    Schedule colonoscopy for colon cancer screening later this year    Continue to follow-up with eye doctor every year    Schedule lab appointment for 2 weeks    Hold vitamin D and calcium supplements until lab results have returned

## 2021-06-20 NOTE — LETTER
Letter by Gerardo Ortiz MD at      Author: Gerardo Ortiz MD Service: -- Author Type: --    Filed:  Encounter Date: 8/6/2020 Status: (Other)         Francoise Moulton  [facility] [facility] [address]             August 6, 2020         Dear Francoise,    Below are the results from your recent visit:    Resulted Orders   DXA Bone Density Scan    Narrative    8/3/2020      RE: Francoise Moulton  YOB: 1945        Dear Gerardo Ortiz,    Patient Profile:  75 y.o. female, postmenopausal, is here for the follow up bone density   test.   History of fractures - Yes;  Shoulder and Wrist. Family history of   osteoporosis - Yes;  mother and aunt.  Family history of hip fracture:   None. Smoking history - Past. Osteoporosis treatment past -  Yes;    Bisphosphonates and Prolia. Osteoporosis treatment current - No.  Chronic   medical problems - Chronic low back problems and High blood calcium level.   High risk medications -  None.      Assessment:    1. The spine bone density L2-L3 with T-score -0.1.  Focal sclerotic   changes are noted in the lumbar vertebrae, which may artifactually elevate   the bone mineral density.  2. Femoral bone densities show left femoral neck T- score -2.0 and right   total hip T-score -0.7.  3. Trabecular bone score indicates good trabecular bone architecture.  4.  Right distal radius T score -2.3.    75 y.o. female with LOW BONE DENSITY (OSTEOPENIA) and HIGH fracture risk,   adjusted for the TBS, with major osteoporotic fracture risk 19.1 % and hip   fracture risk 5.4 %.  According to the World Health Organization, a hip   fracture risk greater than 3.0% can be an indication for evaluation and   treatment of low bone mass.    Since the previous bone density dated  April 24, 2017, there has been no   statistically significant % change in the bone density of the spine and   right total hip.  Additionally there has been a -5.1 % change in the left   total  hip.    Recommendations:  Appropriate calcium, vitamin D supplements, along with balance and weight   bearing exercise recommended.  Additionally,  consider reevaluation and   continued treatment for low bone mass with elevated FRAX with follow up   bone density scan in 2 years.      Bone densitometry was performed on your patient using our [employer] iDXA   densitometer. The results are summarized and a copy of the actual scans   are included for your review. In conformity with the International Society   of Clinical Densitometry's most recent position statement for DXA   interpretation (2015), the diagnosis will be made on the lowest measured   T-score of the lumbar spine, femoral neck, total proximal femur or 33%   radius. Note the change in terminology for diagnostic classification from   OSTEOPENIA to LOW BONE MASS. All trending for sequential exams will be   done using multiple vertebrae or the total proximal femur. Fracture risk   is based on the WHO Fracture Risk Assessment Tool (FRAX). If additional   information is needed or if you would like to discuss the results, please   do not hesitate to call me.       Thank you for referring this patient to [facility].   We are happy to be of service in support of you and your practice. If you   have any questions or suggestions to improve our service, please call me   at [phone].     Sincerely,     Guerita Eagle M.D. [facility]  [facility], [facility]       Unfortunately, the improvement initially seen after starting Prolia has not continued and there appears to be some worsening of your bone density since 2017.  I am questioning whether your recent illness may be a contributing factor.    You remain out of the osteoporosis range.  However, I would recommend repeating the DEXA in 2 years and if further worsening is seen, you would be a candidate for Forteo which is an injection given daily at home.    Please call with  questions or contact us using [employer].    Sincerely,    Electronically signed by Gerardo Ortiz MD

## 2021-06-21 NOTE — PROGRESS NOTES
Assessment and Plan:       1. Medicare annual wellness visit, subsequent  Immunizations are reviewed and recommending flu shot and Shingrix.  She declines having flu shot but will consider new shingles vaccine.  Updating tetanus.  Living will discussed.  Non-smoker.  Uses alcohol in moderation.  Regular exercise discussed.  Up to date with colonoscopies and this should be repeated in 2020.  Breast exam completed and she will continue to get a mammogram annually.  Pelvic exam normal.  Last DEXA was 2017 showing improvement and this should be repeated when Prolia completed. Dementia and depression screening completed.  She sees an ophthalmologist regularly and gets glaucoma screening.  Skin exam performed and recommending regular use of sunblock.  Will screen for diabetes with fasting glucose.      2. Osteoporosis  Continues to receive Prolia every 6 months with plan for a total of 5 years.  DEXA in 2017 after her fourth injection showed good response.  She continues to get adequate calcium and vitamin D and will monitor level.  Encouraging weightbearing exercise.  - Vitamin D, Total (25-Hydroxy)    3. Hypercholesterolemia with coronary calcification noted on CT  Recheck lipid profile.  I have recommended retrying a statin.  She developed headaches with atorvastatin and is reluctant.  We discussed the role in reducing risk for cardiovascular events.  - Lipid Cascade        5. Mild aortic stenosis  We will plan to repeat echocardiogram in 2019 to document stability.  Asymptomatic.    6. Monoclonal gammopathy of undetermined significance  She missed her follow-up with her hematologist this summer.  Will check appropriate labs.  - Electrophoresis, Protein, Serum  - Comprehensive Metabolic Panel  - HM2(CBC w/o Differential)    7. SVT (supraventricular tachycardia) (H)  History of ablation without recurrence    8. Pulmonary nodule  Incidentally noted in 2015.  Evaluated by pulmonary and follow-up CT 2016 showing benign  pulmonary nodule unchanged in size.  - XR Chest 2 Views    9. Diaphragmatic hernia  Minimally symptomatic    10. Chronic low back pain without sciatica  Continue low back exercises    11. Primary osteoarthritis of right hip  Pain is significantly better    12. Dilatation of thoracic aorta (H)  Mild dilation noted on echocardiogram 2017.  Will repeat 2019.    13. Pain in and around eye, right  Low suspicion but will check sed rate to evaluate for temporal arteritis.  Recommending follow-up with her ophthalmologist.  - Sedimentation Rate    14. Healthcare maintenance    - Urinalysis        The patient's current medical problems were reviewed.    Over 25 minutes was spent addressing these chronic and new medical problems beyond time spent performing annual wellness visit with over 50% of the time spent counseling and coordination of care    I have had an Advance Directives discussion with the patient.  The following health maintenance schedule was reviewed with the patient and provided in printed form in the after visit summary:   Health Maintenance   Topic Date Due     INFLUENZA VACCINE RULE BASED (1) 08/01/2018     DXA SCAN  04/24/2019     FALL RISK ASSESSMENT  10/22/2019     COLONOSCOPY  01/18/2020     ADVANCE DIRECTIVES DISCUSSED WITH PATIENT  04/17/2022     TD 18+ HE  10/22/2028     PNEUMOCOCCAL POLYSACCHARIDE VACCINE AGE 65 AND OVER  Completed     PNEUMOCOCCAL CONJUGATE VACCINE FOR ADULTS (PCV13 OR PREVNAR)  Completed     ZOSTER VACCINE  Completed        Subjective:   Chief Complaint: Francoise Moulton is an 73 y.o. female here for an Annual Wellness visit.   HPI: In addition to wellness visit, multiple chronic medical problems and new concerns discussed    History of MGUS, followed by hematology but did not go to appointment this summer.  Has been asymptomatic.  SPEP in 2017 unchanged.  No night sweats, lymphadenopathy or fevers or chills.  Noted to have nearly 30 pound weight loss but this was  intentional.    Osteoporosis.  Remains on Prolia every 6 months tolerating without side effects.  DEXA in 2017 did show good response.  She tries to maintain adequate calcium and vitamin D with supplements in her diet.    History of SVT without recurrence and no palpitations since ablation    Hypercholesterolemia with history of calcified coronary arteries.  Denies exertional chest pain.  Did not tolerate atorvastatin causing headaches in the past.    Mild aortic stenosis found on echocardiogram.  Denies exertional chest pain, increasing dyspnea, or syncope.    Review of Systems:    Please see above.  The rest of the review of systems are negative for all systems.    Patient Care Team:  Gerardo Ortiz MD as PCP - General (Internal Medicine)     Patient Active Problem List   Diagnosis     Coronary atherosclerosis due to calcified coronary lesion     SVT (supraventricular tachycardia) (H)     Monoclonal gammopathy of undetermined significance     Varicose veins     Macrocytosis     Hypercholesterolemia     Diaphragmatic hernia     Osteoarthritis of right hip     Chronic low back pain without sciatica     Pulmonary nodule     Osteoporosis     Dilatation of thoracic aorta (H)     Mild aortic stenosis     Dyspnea on exertion     Pain in and around eye, right     Past Medical History:   Diagnosis Date     ASCVD (arteriosclerotic cardiovascular disease)     CT angiogram with nonobstructive coronary artery disease February 2015 following abnormal stress test     Chronic low back pain without sciatica 4/17/2017     Chronic sore throat 2015    GERD suspected Fosamax discontinued     Diaphragmatic hernia     Loop of bowel in chest possibility contributing to chronic dyspnea 2015     Dilatation of thoracic aorta (H)     Mild dilation of thoracic aorta reported on echocardiogram 2017 Disequilibrium 2007 [facility]     Dyspnea on exertion     Dr. Wilfrido Travis 2015.  Echocardiogram April 2017 with normal left ventricular  systolic function EF 55% with mild aortic stenosis     Epigastric abdominal pain 04/17/2017    Vague abdominal symptoms, ultrasound with cholelithiasis, discussed evaluation by surgery and she will consider     Hypercholesterolemia      Inverted nipple 1994     Macrocytosis     B12 and folic acid checked 2015     Microscopic hematuria     Negative workup including negative IVP     Mild aortic stenosis     Echo 2015, follow-up echocardiogram 2017 showing minimal change and mild aortic stenosis.  Normal left ventricular function with ejection fraction 55%.     Monoclonal gammopathy of undetermined significance     Dr. Jessica ECHAVARRIA     Osteoarthritis of right hip 4/17/2017     Osteoporosis     Tibial plateau fracture DEXA 2013 T score -2.1, began Prolia early 2015 previously on Fosamax discontinued because of increasing reflux, follow-up DEXA April 2017 with T score -1.8 left femoral neck and radius demonstrating improvement     Pain in and around eye, right 10/22/2018     Pulmonary nodule 4/17/2017    CT scan February 2015.  Obtain follow-up scan from Dr. Travis     SVT (supraventricular tachycardia) (H)     History of ablation     Varicose veins 4/15/2016      Past Surgical History:   Procedure Laterality Date     COLONOSCOPY      Normal 2010     SVT ablation       TONSILLECTOMY       VARICOSE VEIN SURGERY        Family History   Problem Relation Age of Onset     Heart disease Mother      88 yo     Lung cancer Father      79 yo     Cancer Sister      Bladder     No Medical Problems Daughter      Coronary artery disease Maternal Grandmother      No Medical Problems Maternal Grandfather      No Medical Problems Paternal Grandmother      No Medical Problems Paternal Grandfather      No Medical Problems Maternal Aunt      No Medical Problems Paternal Aunt      Breast cancer Cousin 70     Breast cancer Cousin 50     Breast cancer Cousin 40     Atrial fibrillation Sister      BRCA 1/2 Neg Hx      Colon cancer Neg Hx       Endometrial cancer Neg Hx      Ovarian cancer Neg Hx       Social History     Social History     Marital status:      Spouse name: N/A     Number of children: N/A     Years of education: N/A     Occupational History     Not on file.     Social History Main Topics     Smoking status: Former Smoker     Quit date: 2/26/1985     Smokeless tobacco: Never Used     Alcohol use Yes      Comment: rare     Drug use: Not on file     Sexual activity: Not on file     Other Topics Concern     Not on file     Social History Narrative    Retired, [employer]     , 3 children      Current Outpatient Prescriptions   Medication Sig Dispense Refill     CALCIUM CARBONATE (CALCIUM 500 ORAL) Take 500 mg by mouth 3 (three) times a day.        CHOLECALCIFEROL, VITAMIN D3, (VITAMIN D3 ORAL) Take by mouth.       MULTIVIT &MINERALS/FERROUS FUM (MULTI VITAMIN ORAL) Take by mouth.       Current Facility-Administered Medications   Medication Dose Route Frequency Provider Last Rate Last Dose     denosumab 60 mg (PROLIA 60 mg/ml)  60 mg Subcutaneous Q6 Months Gerardo Ortiz MD   60 mg at 10/22/18 1420      Objective:   Vital Signs:   Visit Vitals     /74 (Patient Site: Left Arm, Patient Position: Sitting, Cuff Size: Adult Regular)     Pulse 75     Ht 5' (1.524 m)     Wt 165 lb (74.8 kg)     LMP 01/05/1992     SpO2 95%     BMI 32.22 kg/m2            PHYSICAL EXAM  EYES: Eyelids, conjunctiva, and sclera were normal. Pupils were normal. Cornea, iris, and lens were normal bilaterally.  HEAD, EARS, NOSE, MOUTH, AND THROAT: Head and face were normal. Nose appearance was normal and there was no discharge. Oropharynx was normal.  NECK: Neck appearance was normal. There were no neck masses and the thyroid was not enlarged and no nodules are felt.  No lymphadenopathy.  RESPIRATORY: Breathing pattern was normal and the chest moved symmetrically.  Percussion/auscultatory percussion was normal.  Lung sounds were normal and there were  no rales or wheezes.  CARDIOVASCULAR: Heart rate and rhythm were normal.  S1 and S2 were normal and there were no extra sounds or murmurs. Peripheral pulses in arms and legs were normal.  Jugular venous pressure was normal.  There was no peripheral edema.  No carotid bruits.  BREAST: No palpable masses or tenderness.  No axillary nodes.  GASTROINTESTINAL: The abdomen was normal in contour.  Bowel sounds were present.   Palpation detected no tenderness, mass, or enlarged organs.   PELVIC: No external lesions.   Uterus was normal size, shape, and consistency, without palpable lesions.  Adnexa were nontender without palpable mass.  MUSCULOSKELETAL: Skeletal configuration was normal and muscle mass was normal for age. Joint appearance was overall normal.  LYMPHATIC: There were no enlarged nodes.  SKIN/HAIR/NAILS: Skin color was normal.  Hair and nails were normal.There were no skin lesions.  NEUROLOGIC: The patient was alert and oriented to person, place, time, and circumstance. Speech was normal. Cranial nerves were normal. Motor strength was normal for age. The patient was normally coordinated.  Sensation intact.  PSYCHIATRIC:  Mood and affect were normal and the patient had normal recent and remote memory. The patient's judgment and insight were normal.    Assessment Results 10/22/2018   Activities of Daily Living No help needed   Instrumental Activities of Daily Living No help needed   Get Up and Go Score Less than 12 seconds   Mini Cog Total Score 5   Some recent data might be hidden     A Mini-Cog score of 0-2 suggests the possibility of dementia, score of 3-5 suggests no dementia    Identified Health Risks:     She is at risk for lack of exercise and has been provided with information to increase physical activity for the benefit of her well-being.  The patient was counseled and encouraged to consider modifying their diet and eating habits. She was provided with information on recommended healthy diet  options.  Information regarding advance directives (living quan), including where she can download the appropriate form, was provided to the patient via the AVS.

## 2021-07-03 NOTE — ADDENDUM NOTE
Addendum Note by Gerardo Ortiz MD at 7/8/2020 11:20 AM     Author: Gerardo Ortiz MD Service: -- Author Type: Physician    Filed: 7/10/2020  9:22 PM Encounter Date: 7/8/2020 Status: Signed    : Gerardo Ortiz MD (Physician)    Addended by: GERARDO ORTIZ on: 7/10/2020 09:22 PM        Modules accepted: Orders

## 2021-07-21 ENCOUNTER — RECORDS - HEALTHEAST (OUTPATIENT)
Dept: ADMINISTRATIVE | Facility: CLINIC | Age: 76
End: 2021-07-21

## 2021-08-28 ENCOUNTER — HEALTH MAINTENANCE LETTER (OUTPATIENT)
Age: 76
End: 2021-08-28

## 2021-10-23 ENCOUNTER — HEALTH MAINTENANCE LETTER (OUTPATIENT)
Age: 76
End: 2021-10-23

## 2021-11-22 ENCOUNTER — ALLIED HEALTH/NURSE VISIT (OUTPATIENT)
Dept: FAMILY MEDICINE | Facility: CLINIC | Age: 76
End: 2021-11-22
Payer: COMMERCIAL

## 2021-11-22 DIAGNOSIS — Z23 NEED FOR IMMUNIZATION AGAINST INFLUENZA: Primary | ICD-10-CM

## 2021-11-22 PROCEDURE — 99207 PR NO CHARGE NURSE ONLY: CPT

## 2021-11-22 PROCEDURE — 90662 IIV NO PRSV INCREASED AG IM: CPT

## 2021-11-22 PROCEDURE — G0008 ADMIN INFLUENZA VIRUS VAC: HCPCS

## 2022-04-19 ENCOUNTER — IMMUNIZATION (OUTPATIENT)
Dept: NURSING | Facility: CLINIC | Age: 77
End: 2022-04-19
Payer: COMMERCIAL

## 2022-04-19 PROCEDURE — 91305 COVID-19,PF,PFIZER (12+ YRS): CPT

## 2022-04-19 PROCEDURE — 0054A COVID-19,PF,PFIZER (12+ YRS): CPT

## 2022-10-10 ENCOUNTER — HEALTH MAINTENANCE LETTER (OUTPATIENT)
Age: 77
End: 2022-10-10

## 2022-12-15 ENCOUNTER — IMMUNIZATION (OUTPATIENT)
Dept: FAMILY MEDICINE | Facility: CLINIC | Age: 77
End: 2022-12-15
Payer: COMMERCIAL

## 2022-12-15 PROCEDURE — 0124A COVID-19 VACCINE BIVALENT BOOSTER 12+ (PFIZER): CPT

## 2022-12-15 PROCEDURE — G0008 ADMIN INFLUENZA VIRUS VAC: HCPCS | Mod: 59

## 2022-12-15 PROCEDURE — 90662 IIV NO PRSV INCREASED AG IM: CPT

## 2022-12-15 PROCEDURE — 91312 COVID-19 VACCINE BIVALENT BOOSTER 12+ (PFIZER): CPT

## 2023-07-10 ENCOUNTER — OFFICE VISIT (OUTPATIENT)
Dept: INTERNAL MEDICINE | Facility: CLINIC | Age: 78
End: 2023-07-10
Payer: COMMERCIAL

## 2023-07-10 ENCOUNTER — TELEPHONE (OUTPATIENT)
Dept: CARDIOLOGY | Facility: CLINIC | Age: 78
End: 2023-07-10

## 2023-07-10 VITALS
HEIGHT: 60 IN | SYSTOLIC BLOOD PRESSURE: 106 MMHG | OXYGEN SATURATION: 92 % | WEIGHT: 171.6 LBS | BODY MASS INDEX: 33.69 KG/M2 | DIASTOLIC BLOOD PRESSURE: 68 MMHG | TEMPERATURE: 97.9 F | HEART RATE: 73 BPM | RESPIRATION RATE: 16 BRPM

## 2023-07-10 DIAGNOSIS — Z01.818 PRE-OP EXAM: Primary | ICD-10-CM

## 2023-07-10 DIAGNOSIS — I47.10 SVT (SUPRAVENTRICULAR TACHYCARDIA) (H): ICD-10-CM

## 2023-07-10 DIAGNOSIS — I35.0 AORTIC VALVE STENOSIS, ETIOLOGY OF CARDIAC VALVE DISEASE UNSPECIFIED: ICD-10-CM

## 2023-07-10 DIAGNOSIS — I25.10 NONOBSTRUCTIVE ATHEROSCLEROSIS OF CORONARY ARTERY: ICD-10-CM

## 2023-07-10 DIAGNOSIS — R06.09 DYSPNEA ON EXERTION: ICD-10-CM

## 2023-07-10 DIAGNOSIS — M17.11 PRIMARY OSTEOARTHRITIS OF RIGHT KNEE: ICD-10-CM

## 2023-07-10 LAB
ANION GAP SERPL CALCULATED.3IONS-SCNC: 12 MMOL/L (ref 7–15)
BUN SERPL-MCNC: 12.3 MG/DL (ref 8–23)
CALCIUM SERPL-MCNC: 9.3 MG/DL (ref 8.8–10.2)
CHLORIDE SERPL-SCNC: 105 MMOL/L (ref 98–107)
CREAT SERPL-MCNC: 0.68 MG/DL (ref 0.51–0.95)
DEPRECATED HCO3 PLAS-SCNC: 26 MMOL/L (ref 22–29)
ERYTHROCYTE [DISTWIDTH] IN BLOOD BY AUTOMATED COUNT: 13.7 % (ref 10–15)
GFR SERPL CREATININE-BSD FRML MDRD: 88 ML/MIN/1.73M2
GLUCOSE SERPL-MCNC: 94 MG/DL (ref 70–99)
HCT VFR BLD AUTO: 41.8 % (ref 35–47)
HGB BLD-MCNC: 13.4 G/DL (ref 11.7–15.7)
MCH RBC QN AUTO: 31.4 PG (ref 26.5–33)
MCHC RBC AUTO-ENTMCNC: 32.1 G/DL (ref 31.5–36.5)
MCV RBC AUTO: 98 FL (ref 78–100)
PLATELET # BLD AUTO: 301 10E3/UL (ref 150–450)
POTASSIUM SERPL-SCNC: 3.9 MMOL/L (ref 3.4–5.3)
RBC # BLD AUTO: 4.27 10E6/UL (ref 3.8–5.2)
SODIUM SERPL-SCNC: 143 MMOL/L (ref 136–145)
WBC # BLD AUTO: 6.8 10E3/UL (ref 4–11)

## 2023-07-10 PROCEDURE — 99215 OFFICE O/P EST HI 40 MIN: CPT | Performed by: INTERNAL MEDICINE

## 2023-07-10 PROCEDURE — 80048 BASIC METABOLIC PNL TOTAL CA: CPT | Performed by: INTERNAL MEDICINE

## 2023-07-10 PROCEDURE — 93010 ELECTROCARDIOGRAM REPORT: CPT | Performed by: INTERNAL MEDICINE

## 2023-07-10 PROCEDURE — 36415 COLL VENOUS BLD VENIPUNCTURE: CPT | Performed by: INTERNAL MEDICINE

## 2023-07-10 PROCEDURE — 93005 ELECTROCARDIOGRAM TRACING: CPT | Performed by: INTERNAL MEDICINE

## 2023-07-10 PROCEDURE — 85027 COMPLETE CBC AUTOMATED: CPT | Performed by: INTERNAL MEDICINE

## 2023-07-10 RX ORDER — MULTIPLE VITAMINS W/ MINERALS TAB 9MG-400MCG
1 TAB ORAL DAILY
COMMUNITY

## 2023-07-10 NOTE — PROGRESS NOTES
[facility]  [address] [address]  Phone: [phone]  Fax: [phone]  Primary Provider: Gerardo Ortiz  Pre-op Performing Provider: GERARDO ORTIZ      PREOPERATIVE EVALUATION:  Today's date: 7/10/2023    Francoise Moulton is a 78 year old female who presents for a preoperative evaluation.      7/10/2023     3:12 PM   Additional Questions   Roomed by      Surgical Information:  Surgery/Procedure: Right TKA  Surgery Location: [place]  Surgeon: Dr. Boone  Surgery Date: 7/12/23  Time of Surgery:   Where patient plans to recover: At home with family  Fax number for surgical facility:     Assessment & Plan     The proposed surgical procedure is considered INTERMEDIATE risk.    Pre-op exam  78-year-old woman here for preop clearance prior to upcoming right total knee replacement for severe osteoarthritis.  Her experience with surgery and anesthesia has been limited but she has had no previous problems.  However, she has been experiencing dyspnea with exertion over the past 2 years and does have known aortic stenosis.  Last echocardiogram was October 2021 and aortic stenosis was still described as mild.  However, she has a loud systolic murmur found during today's exam and this combined with known history of nonobstructive coronary artery disease warrants further cardiac evaluation prior to being cleared to proceed with her surgery.        Addendum-stress test is completed showing no coronary ischemia.  Echocardiogram demonstrating moderate aortic stenosis but normal left ventricular systolic function and no other significant abnormalities.  In light of the relatively normal cardiac work-up, Francoise is cleared to proceed with surgery as planned.      See below for further details.  - EKG 12-lead, tracing only  - CBC with platelets; Future  - Basic metabolic panel  (Ca, Cl, CO2, Creat, Gluc, K, Na, BUN); Future    Primary osteoarthritis of right  knee  Severe right knee pain with plans for knee replacement as no longer responding to conservative treatment    Dyspnea on exertion  Cardiac work-up completed.  Normal stress test without coronary ischemia and echocardiogram demonstrating only moderate aortic stenosis and normal left ventricular systolic function.  - Echocardiogram Complete; Future  - NM Lexiscan stress test; Future    Aortic valve stenosis, etiology of cardiac valve disease unspecified  As above, echocardiogram is being ordered  - Echocardiogram Complete; Future    Addendum-echocardiogram demonstrating moderate aortic stenosis    Nonobstructive atherosclerosis of coronary artery  Nuclear stress test without coronary ischemia    SVT (supraventricular tachycardia) (H)  History of ablation without recurrence                 Antiplatelet or Anticoagulation Medication Instructions:   - Patient is on no antiplatelet or anticoagulation medications.    Additional Medication Instructions:  She will avoid aspirin and NSAIDs for 1 week prior to surgery    RECOMMENDATION:  APPROVAL GIVEN to proceed with proposed procedure without further diagnostic studies needed.      40 minutes spent by me on the date of the encounter doing chart review, history and exam, documentation and further activities per the note      Subjective       HPI related to upcoming procedure: 78-year-old woman here for preop clearance prior to right total knee replacement.  See assessment and plan and information below for further details        7/10/2023     3:12 PM   Preop Questions   1. Have you ever had a heart attack or stroke? No   2. Have you ever had surgery on your heart or blood vessels, such as a stent placement, a coronary artery bypass, or surgery on an artery in your head, neck, heart, or legs? YES -history of ablation for SVT   3. Do you have chest pain with activity? No   4. Do you have a history of  heart failure? No   5. Do you currently have a cold, bronchitis or  symptoms of other infection? No   6. Do you have a cough, shortness of breath, or wheezing? No   7. Do you or anyone in your family have previous history of blood clots? No   8. Do you or does anyone in your family have a serious bleeding problem such as prolonged bleeding following surgeries or cuts? No   9. Have you ever had problems with anemia or been told to take iron pills? No   10. Have you had any abnormal blood loss such as black, tarry or bloody stools, or abnormal vaginal bleeding? No   11. Have you ever had a blood transfusion? No   12. Are you willing to have a blood transfusion if it is medically needed before, during, or after your surgery? Yes   13. Have you or any of your relatives ever had problems with anesthesia? No   14. Do you have sleep apnea, excessive snoring or daytime drowsiness? No   15. Do you have any artifical heart valves or other implanted medical devices like a pacemaker, defibrillator, or continuous glucose monitor? No   16. Do you have artificial joints? No   17. Are you allergic to latex? No       Health Care Directive:  Patient does not have a Health Care Directive or Living Will:         Review of Systems  12 point review of systems is negative other than what is discussed in the assessment and plan    Denies any chest pain but is experiencing dyspnea with exertion    Patient Active Problem List    Diagnosis Date Noted    Osteoporosis      Priority: Medium     Tibial plateau fracture DEXA 2013 T score -2.1, began Prolia early 2015   previously on Fosamax discontinued because of increasing reflux, follow-up   DEXA April 2017 with T score -1.8 left femoral neck and radius   demonstrating improvement.  Completed Prolia 2020 DEXA August 2020 T score   -2.0 left femoral neck and -2.3 radius suggesting worsening.        Anemia 07/14/2020     Priority: Medium    Acute bacterial endocarditis 04/24/2020     Priority: Medium     Hospitalized with gram-negative sepsis found to have  vegetation on mitral   valve consistent with endocarditis receiving 6 weeks of IV antibiotics   with ceftriaxone        Osteoarthritis of right hip 04/17/2017     Priority: Medium    Chronic low back pain without sciatica 04/17/2017     Priority: Medium    Varicose veins 04/15/2016     Priority: Medium    SVT (supraventricular tachycardia) (H)      Priority: Medium     History of ablation        Monoclonal gammopathy of undetermined significance      Priority: Medium    Macrocytosis      Priority: Medium     B12 and folic acid checked 2015        Hypercholesterolemia      Priority: Medium    Diaphragmatic hernia      Priority: Medium     Loop of bowel in chest possibility contributing to chronic dyspnea 2015        Coronary atherosclerosis due to calcified coronary lesion 03/20/2015     Priority: Medium     CT angiogram with nonobstructive coronary artery disease February 2015   following abnormal stress test          Past Medical History:   Diagnosis Date    Acute bacterial endocarditis 04/24/2020    Hospitalized with gram-negative sepsis found to have vegetation on mitral valve consistent with endocarditis receiving 6 weeks of IV antibiotics with ceftriaxone    Anemia 07/14/2020    ASCVD (arteriosclerotic cardiovascular disease)     CT angiogram with nonobstructive coronary artery disease February 2015 following abnormal stress test    Chronic low back pain without sciatica 04/17/2017    Chronic sore throat 2015    GERD suspected Fosamax discontinued    Diaphragmatic hernia     Loop of bowel in chest possibility contributing to chronic dyspnea 2015 Disequilibrium 2007 [facility]    Dyspnea on exertion     Dr. Wilfrido Travis 2015.  Echocardiogram April 2017 with normal left ventricular systolic function EF 55% with mild aortic stenosis    Epigastric abdominal pain 04/17/2017    Vague abdominal symptoms, ultrasound with cholelithiasis, discussed evaluation by surgery and she will consider    Hypercalcemia 07/09/2020     PTH normal.  Secondary to excessive vitamin D intake.  Improved after discontinuing vitamin D.    Hypercholesterolemia     Inverted nipple 1994    Macrocytosis     B12 and folic acid checked 2015    Microscopic hematuria     Negative workup including negative IVP    Monoclonal gammopathy of undetermined significance     Dr. Jessica ECHAVARRIA    Osteoarthritis of right hip 04/17/2017    Osteoporosis     Tibial plateau fracture DEXA 2013 T score -2.1, began Prolia early 2015 previously on Fosamax discontinued because of increasing reflux, follow-up DEXA April 2017 with T score -1.8 left femoral neck and radius demonstrating improvement.  Completed Prolia 2020 DEXA August 2020 T score -2.0 left femoral neck and -2.3 radius suggesting worsening.    Pain in and around eye, right 10/22/2018    Pulmonary nodule 04/17/2017    CT scan February 2015.  No nodules identified on CT 2020    SVT (supraventricular tachycardia) (H)     History of ablation    Varicose veins 04/15/2016     Past Surgical History:   Procedure Laterality Date    COLONOSCOPY      Normal 2010    OTHER SURGICAL HISTORY      SVT ablation    STRIP VEIN      TONSILLECTOMY       Current Outpatient Medications   Medication Sig Dispense Refill    Calcium Carbonate Antacid (CALCIUM CARBONATE PO)       multivitamin w/minerals (MULTI-VITAMIN) tablet Take 1 tablet by mouth daily      HYDROcodone-acetaminophen (NORCO) 5-325 MG tablet Take 1 tablet by mouth every 6 hours as needed for pain 8 tablet 0    ibuprofen (ADVIL/MOTRIN) 600 MG tablet Take 1 tablet (600 mg) by mouth every 6 hours as needed 20 tablet 0       Allergies   Allergen Reactions    Atorvastatin Headache     Patient states she had severe headaches    Fosamax [Alendronate] Unknown     Increasing reflux, chronic sore throat    Hydrocodone-Acetaminophen Other (See Comments)     Patient states she got sick to her stomach.        Social History     Tobacco Use    Smoking status: Former     Types: Cigarettes      Quit date: 1985     Years since quittin.3    Smokeless tobacco: Never   Substance Use Topics    Alcohol use: Yes     Comment: Alcoholic Drinks/day: rare     Family History   Problem Relation Age of Onset    Heart Disease Mother         90 yo    Lung Cancer Father         79 yo    Cancer Sister         Bladder    No Known Problems Daughter     Coronary Artery Disease Maternal Grandmother     No Known Problems Maternal Grandfather     No Known Problems Paternal Grandmother     No Known Problems Paternal Grandfather     No Known Problems Maternal Aunt     No Known Problems Paternal Aunt     Breast Cancer Cousin 70.00    Breast Cancer Cousin 50.00    Breast Cancer Cousin 40.00    Atrial fibrillation Sister     Breast Cancer Sister 59.00    Hereditary Breast and Ovarian Cancer Syndrome No family hx of     Colon Cancer No family hx of     Endometrial Cancer No family hx of     Ovarian Cancer No family hx of      History   Drug Use Not on file         Objective     /68 (BP Location: Right arm, Patient Position: Sitting, Cuff Size: Adult Regular)   Pulse 73   Temp 97.9  F (36.6  C) (Oral)   Resp 16   Ht 1.524 m (5')   Wt 77.8 kg (171 lb 9.6 oz)   SpO2 92%   BMI 33.51 kg/m      Physical Exam  GENERAL APPEARANCE: healthy, alert and no distress  HENT: Normal  RESP: lungs clear to auscultation - no rales, rhonchi or wheezes  CV: regular rate and rhythm, 3/6 systolic murmur.  No carotid bruits.  No edema.  Good pedal pulses  ABDOMEN: soft, nontender, no HSM or masses and bowel sounds normal  NEURO: Normal strength and tone, sensory exam grossly normal, mentation intact and speech normal        Diagnostics:  Labs-  Hemoglobin 13.4 platelet 301  Potassium 3.9 creatinine 0.68     EKG: Normal sinus rhythm with nonspecific T wave abnormality    Revised Cardiac Risk Index (RCRI):  The patient has the following serious cardiovascular risks for perioperative complications:   - No serious cardiac risks = 0  points     RCRI Interpretation: 0 points: Class I (very low risk - 0.4% complication rate)           Signed Electronically by: Gerardo Ortiz MD  Copy of this evaluation report is provided to requesting physician.

## 2023-07-10 NOTE — TELEPHONE ENCOUNTER
[facility] Call Center    Phone Message    May a detailed message be left on voicemail: yes     Reason for Call: Other:    Patient  called to schedule stress test and echo. Please call patient cell to schedule.     Action Taken: Other: Cardiology    Travel Screening: Not Applicable     Thank you!  [facility]

## 2023-07-11 LAB
ATRIAL RATE - MUSE: 71 BPM
DIASTOLIC BLOOD PRESSURE - MUSE: NORMAL MMHG
INTERPRETATION ECG - MUSE: NORMAL
P AXIS - MUSE: 34 DEGREES
PR INTERVAL - MUSE: 168 MS
QRS DURATION - MUSE: 96 MS
QT - MUSE: 400 MS
QTC - MUSE: 434 MS
R AXIS - MUSE: -55 DEGREES
SYSTOLIC BLOOD PRESSURE - MUSE: NORMAL MMHG
T AXIS - MUSE: 80 DEGREES
VENTRICULAR RATE- MUSE: 71 BPM

## 2023-07-31 ENCOUNTER — HOSPITAL ENCOUNTER (OUTPATIENT)
Dept: CARDIOLOGY | Facility: HOSPITAL | Age: 78
Discharge: HOME | End: 2023-07-31
Attending: INTERNAL MEDICINE
Payer: COMMERCIAL

## 2023-07-31 ENCOUNTER — HOSPITAL ENCOUNTER (OUTPATIENT)
Dept: NUCLEAR MEDICINE | Facility: HOSPITAL | Age: 78
Discharge: HOME | End: 2023-07-31
Attending: INTERNAL MEDICINE
Payer: COMMERCIAL

## 2023-07-31 DIAGNOSIS — I35.0 AORTIC VALVE STENOSIS, ETIOLOGY OF CARDIAC VALVE DISEASE UNSPECIFIED: ICD-10-CM

## 2023-07-31 DIAGNOSIS — R06.09 DYSPNEA ON EXERTION: ICD-10-CM

## 2023-07-31 LAB
CV STRESS CURRENT BP HE: NORMAL
CV STRESS CURRENT HR HE: 62
CV STRESS CURRENT HR HE: 62
CV STRESS CURRENT HR HE: 63
CV STRESS CURRENT HR HE: 70
CV STRESS CURRENT HR HE: 71
CV STRESS CURRENT HR HE: 72
CV STRESS CURRENT HR HE: 72
CV STRESS CURRENT HR HE: 73
CV STRESS CURRENT HR HE: 73
CV STRESS CURRENT HR HE: 74
CV STRESS CURRENT HR HE: 75
CV STRESS CURRENT HR HE: 79
CV STRESS CURRENT HR HE: 85
CV STRESS CURRENT HR HE: 86
CV STRESS DEVIATION TIME HE: NORMAL
CV STRESS ECHO PERCENT HR HE: NORMAL
CV STRESS EXERCISE STAGE HE: NORMAL
CV STRESS FINAL RESTING BP HE: NORMAL
CV STRESS FINAL RESTING HR HE: 70
CV STRESS MAX HR HE: 86
CV STRESS MAX TREADMILL GRADE HE: 0
CV STRESS MAX TREADMILL SPEED HE: 0
CV STRESS PEAK DIA BP HE: NORMAL
CV STRESS PEAK SYS BP HE: NORMAL
CV STRESS PHASE HE: NORMAL
CV STRESS PROTOCOL HE: NORMAL
CV STRESS RESTING PT POSITION HE: NORMAL
CV STRESS ST DEVIATION AMOUNT HE: NORMAL
CV STRESS ST DEVIATION ELEVATION HE: NORMAL
CV STRESS ST EVELATION AMOUNT HE: NORMAL
CV STRESS TEST TYPE HE: NORMAL
CV STRESS TOTAL STAGE TIME MIN 1 HE: NORMAL
LVEF ECHO: NORMAL
RATE PRESSURE PRODUCT: NORMAL
STRESS ECHO BASELINE DIASTOLIC HE: 75
STRESS ECHO BASELINE HR: 59
STRESS ECHO BASELINE SYSTOLIC BP: 120
STRESS ECHO CALCULATED PERCENT HR: 61 %
STRESS ECHO LAST STRESS DIASTOLIC BP: 67
STRESS ECHO LAST STRESS HR: 73
STRESS ECHO LAST STRESS SYSTOLIC BP: 123
STRESS ECHO TARGET HR: 140

## 2023-07-31 PROCEDURE — 78452 HT MUSCLE IMAGE SPECT MULT: CPT | Mod: 26 | Performed by: INTERNAL MEDICINE

## 2023-07-31 PROCEDURE — 93017 CV STRESS TEST TRACING ONLY: CPT

## 2023-07-31 PROCEDURE — A9500 TC99M SESTAMIBI: HCPCS | Performed by: INTERNAL MEDICINE

## 2023-07-31 PROCEDURE — 93306 TTE W/DOPPLER COMPLETE: CPT

## 2023-07-31 PROCEDURE — 250N000011 HC RX IP 250 OP 636: Mod: JZ | Performed by: INTERNAL MEDICINE

## 2023-07-31 PROCEDURE — 93306 TTE W/DOPPLER COMPLETE: CPT | Mod: 26 | Performed by: INTERNAL MEDICINE

## 2023-07-31 PROCEDURE — 93018 CV STRESS TEST I&R ONLY: CPT | Performed by: INTERNAL MEDICINE

## 2023-07-31 PROCEDURE — 343N000001 HC RX 343: Performed by: INTERNAL MEDICINE

## 2023-07-31 PROCEDURE — 93016 CV STRESS TEST SUPVJ ONLY: CPT | Performed by: INTERNAL MEDICINE

## 2023-07-31 PROCEDURE — 78452 HT MUSCLE IMAGE SPECT MULT: CPT

## 2023-07-31 RX ORDER — CAFFEINE 200 MG
200 TABLET ORAL
Status: DISCONTINUED | OUTPATIENT
Start: 2023-07-31 | End: 2023-07-31 | Stop reason: HOSPADM

## 2023-07-31 RX ORDER — AMINOPHYLLINE 25 MG/ML
50 INJECTION, SOLUTION INTRAVENOUS
Status: DISCONTINUED | OUTPATIENT
Start: 2023-07-31 | End: 2023-07-31 | Stop reason: HOSPADM

## 2023-07-31 RX ORDER — ALBUTEROL SULFATE 0.83 MG/ML
2.5 SOLUTION RESPIRATORY (INHALATION)
Status: DISCONTINUED | OUTPATIENT
Start: 2023-07-31 | End: 2023-07-31 | Stop reason: HOSPADM

## 2023-07-31 RX ORDER — REGADENOSON 0.08 MG/ML
0.4 INJECTION, SOLUTION INTRAVENOUS ONCE
Status: COMPLETED | OUTPATIENT
Start: 2023-07-31 | End: 2023-07-31

## 2023-07-31 RX ORDER — CAFFEINE CITRATE 20 MG/ML
60 SOLUTION INTRAVENOUS
Status: DISCONTINUED | OUTPATIENT
Start: 2023-07-31 | End: 2023-07-31 | Stop reason: HOSPADM

## 2023-07-31 RX ADMIN — Medication 8.2 MILLICURIE: at 13:18

## 2023-07-31 RX ADMIN — REGADENOSON 0.4 MG: 0.08 INJECTION, SOLUTION INTRAVENOUS at 14:31

## 2023-07-31 RX ADMIN — Medication 31.8 MILLICURIE: at 15:20

## 2023-10-29 ENCOUNTER — HEALTH MAINTENANCE LETTER (OUTPATIENT)
Age: 78
End: 2023-10-29

## 2023-11-30 ENCOUNTER — PATIENT OUTREACH (OUTPATIENT)
Dept: INTERNAL MEDICINE | Facility: CLINIC | Age: 78
End: 2023-11-30
Payer: COMMERCIAL

## 2023-11-30 NOTE — TELEPHONE ENCOUNTER
Patient Quality Outreach    Patient is due for the following:   Physical Annual Wellness Visit      Topic Date Due    Flu Vaccine (1) 09/01/2023    COVID-19 Vaccine (6 - 2023-24 season) 09/01/2023       Next Steps:   Schedule a Annual Wellness Visit    Type of outreach:    Sent [facility] message.      Questions for provider review:    None     Reina Brown MA

## 2024-05-29 ENCOUNTER — TELEPHONE (OUTPATIENT)
Dept: INTERNAL MEDICINE | Facility: CLINIC | Age: 79
End: 2024-05-29
Payer: COMMERCIAL

## 2024-05-29 NOTE — TELEPHONE ENCOUNTER
Patient Quality Outreach    Patient is due for the following:   Physical Annual Wellness Visit    Next Steps:   Pt.To Schedule AWV    Type of outreach:    Sent [employer] message.      Questions for provider review:    None           Jnenifer Burnett CMA

## 2024-05-29 NOTE — LETTER
May 29, 2024      Francoise Moulton  [address]  [place] [address]      Your healthcare team cares about your health. To provide you with the best care, we have reviewed your chart and based on our findings, we see that you are due to:     PREVENTATIVE VISIT: Annual Medicare Wellness:Schedule an Annual Medicare Wellness Exam. Please call your [facility] [place] clinic to set up your appointment.    If you have already completed these items, please contact the clinic via phone or Mychart so your care team can review and update your records.  Thank you for choosing [facility] for your healthcare needs. For any questions, concerns, or to schedule an appointment please contact the clinic.     Healthy Regards,    Your [facility] Care Team

## 2024-12-21 ENCOUNTER — HEALTH MAINTENANCE LETTER (OUTPATIENT)
Age: 79
End: 2024-12-21